# Patient Record
Sex: FEMALE | Race: ASIAN | NOT HISPANIC OR LATINO | URBAN - METROPOLITAN AREA
[De-identification: names, ages, dates, MRNs, and addresses within clinical notes are randomized per-mention and may not be internally consistent; named-entity substitution may affect disease eponyms.]

---

## 2021-01-05 VITALS
SYSTOLIC BLOOD PRESSURE: 153 MMHG | RESPIRATION RATE: 88 BRPM | TEMPERATURE: 98 F | HEART RATE: 88 BPM | DIASTOLIC BLOOD PRESSURE: 66 MMHG | OXYGEN SATURATION: 97 %

## 2021-01-05 LAB
ALBUMIN SERPL ELPH-MCNC: 3.3 G/DL — SIGNIFICANT CHANGE UP (ref 3.3–5)
ALP SERPL-CCNC: 79 U/L — SIGNIFICANT CHANGE UP (ref 40–120)
ALT FLD-CCNC: 16 U/L — SIGNIFICANT CHANGE UP (ref 4–33)
ANION GAP SERPL CALC-SCNC: 13 MMOL/L — SIGNIFICANT CHANGE UP (ref 7–14)
APTT BLD: 30.1 SEC — SIGNIFICANT CHANGE UP (ref 27–36.3)
AST SERPL-CCNC: 35 U/L — HIGH (ref 4–32)
BASE EXCESS BLDV CALC-SCNC: 1.9 MMOL/L — SIGNIFICANT CHANGE UP (ref -3–2)
BASOPHILS # BLD AUTO: 0.02 K/UL — SIGNIFICANT CHANGE UP (ref 0–0.2)
BASOPHILS NFR BLD AUTO: 0.2 % — SIGNIFICANT CHANGE UP (ref 0–2)
BILIRUB SERPL-MCNC: 0.4 MG/DL — SIGNIFICANT CHANGE UP (ref 0.2–1.2)
BLOOD GAS VENOUS - CREATININE: 1.7 MG/DL — HIGH (ref 0.5–1.3)
BLOOD GAS VENOUS COMPREHENSIVE RESULT: SIGNIFICANT CHANGE UP
BUN SERPL-MCNC: 34 MG/DL — HIGH (ref 7–23)
CALCIUM SERPL-MCNC: 9.9 MG/DL — SIGNIFICANT CHANGE UP (ref 8.4–10.5)
CHLORIDE BLDV-SCNC: 103 MMOL/L — SIGNIFICANT CHANGE UP (ref 96–108)
CHLORIDE SERPL-SCNC: 98 MMOL/L — SIGNIFICANT CHANGE UP (ref 98–107)
CO2 SERPL-SCNC: 23 MMOL/L — SIGNIFICANT CHANGE UP (ref 22–31)
CREAT SERPL-MCNC: 1.38 MG/DL — HIGH (ref 0.5–1.3)
EOSINOPHIL # BLD AUTO: 0.09 K/UL — SIGNIFICANT CHANGE UP (ref 0–0.5)
EOSINOPHIL NFR BLD AUTO: 0.9 % — SIGNIFICANT CHANGE UP (ref 0–6)
GAS PNL BLDV: 134 MMOL/L — LOW (ref 136–146)
GLUCOSE BLDV-MCNC: 125 MG/DL — HIGH (ref 70–99)
GLUCOSE SERPL-MCNC: 116 MG/DL — HIGH (ref 70–99)
HCO3 BLDV-SCNC: 25 MMOL/L — SIGNIFICANT CHANGE UP (ref 20–27)
HCT VFR BLD CALC: 43.6 % — SIGNIFICANT CHANGE UP (ref 34.5–45)
HCT VFR BLDA CALC: 43.5 % — SIGNIFICANT CHANGE UP (ref 34.5–46.5)
HGB BLD CALC-MCNC: 14.2 G/DL — SIGNIFICANT CHANGE UP (ref 11.5–15.5)
HGB BLD-MCNC: 13.6 G/DL — SIGNIFICANT CHANGE UP (ref 11.5–15.5)
IANC: 7.15 K/UL — SIGNIFICANT CHANGE UP (ref 1.5–8.5)
IMM GRANULOCYTES NFR BLD AUTO: 0.7 % — SIGNIFICANT CHANGE UP (ref 0–1.5)
INR BLD: 1.09 RATIO — SIGNIFICANT CHANGE UP (ref 0.88–1.16)
LACTATE BLDV-MCNC: 2.7 MMOL/L — HIGH (ref 0.5–2)
LYMPHOCYTES # BLD AUTO: 1.46 K/UL — SIGNIFICANT CHANGE UP (ref 1–3.3)
LYMPHOCYTES # BLD AUTO: 15 % — SIGNIFICANT CHANGE UP (ref 13–44)
MCHC RBC-ENTMCNC: 28.3 PG — SIGNIFICANT CHANGE UP (ref 27–34)
MCHC RBC-ENTMCNC: 31.2 GM/DL — LOW (ref 32–36)
MCV RBC AUTO: 90.6 FL — SIGNIFICANT CHANGE UP (ref 80–100)
MONOCYTES # BLD AUTO: 0.97 K/UL — HIGH (ref 0–0.9)
MONOCYTES NFR BLD AUTO: 9.9 % — SIGNIFICANT CHANGE UP (ref 2–14)
NEUTROPHILS # BLD AUTO: 7.15 K/UL — SIGNIFICANT CHANGE UP (ref 1.8–7.4)
NEUTROPHILS NFR BLD AUTO: 73.3 % — SIGNIFICANT CHANGE UP (ref 43–77)
NRBC # BLD: 0 /100 WBCS — SIGNIFICANT CHANGE UP
NRBC # FLD: 0 K/UL — SIGNIFICANT CHANGE UP
PCO2 BLDV: 52 MMHG — HIGH (ref 41–51)
PH BLDV: 7.34 — SIGNIFICANT CHANGE UP (ref 7.32–7.43)
PLATELET # BLD AUTO: 313 K/UL — SIGNIFICANT CHANGE UP (ref 150–400)
PO2 BLDV: 58 MMHG — HIGH (ref 35–40)
POTASSIUM BLDV-SCNC: 6 MMOL/L — HIGH (ref 3.4–4.5)
POTASSIUM SERPL-MCNC: 5.2 MMOL/L — SIGNIFICANT CHANGE UP (ref 3.5–5.3)
POTASSIUM SERPL-SCNC: 5.2 MMOL/L — SIGNIFICANT CHANGE UP (ref 3.5–5.3)
PROT SERPL-MCNC: 8.1 G/DL — SIGNIFICANT CHANGE UP (ref 6–8.3)
PROTHROM AB SERPL-ACNC: 12.4 SEC — SIGNIFICANT CHANGE UP (ref 10.6–13.6)
RBC # BLD: 4.81 M/UL — SIGNIFICANT CHANGE UP (ref 3.8–5.2)
RBC # FLD: 13.4 % — SIGNIFICANT CHANGE UP (ref 10.3–14.5)
SAO2 % BLDV: 86.2 % — HIGH (ref 60–85)
SODIUM SERPL-SCNC: 134 MMOL/L — LOW (ref 135–145)
TROPONIN T, HIGH SENSITIVITY RESULT: 21 NG/L — SIGNIFICANT CHANGE UP
WBC # BLD: 9.76 K/UL — SIGNIFICANT CHANGE UP (ref 3.8–10.5)
WBC # FLD AUTO: 9.76 K/UL — SIGNIFICANT CHANGE UP (ref 3.8–10.5)

## 2021-01-05 PROCEDURE — 71045 X-RAY EXAM CHEST 1 VIEW: CPT | Mod: 26

## 2021-01-05 RX ORDER — SODIUM CHLORIDE 9 MG/ML
1000 INJECTION INTRAMUSCULAR; INTRAVENOUS; SUBCUTANEOUS ONCE
Refills: 0 | Status: COMPLETED | OUTPATIENT
Start: 2021-01-05 | End: 2021-01-05

## 2021-01-05 RX ADMIN — SODIUM CHLORIDE 1000 MILLILITER(S): 9 INJECTION INTRAMUSCULAR; INTRAVENOUS; SUBCUTANEOUS at 23:49

## 2021-01-05 NOTE — ED ADULT NURSE NOTE - NSIMPLEMENTINTERV_GEN_ALL_ED
Implemented All Fall with Harm Risk Interventions:  [place] to call system. Call bell, personal items and telephone within reach. Instruct patient to call for assistance. Room bathroom lighting operational. Non-slip footwear when patient is off stretcher. Physically safe environment: no spills, clutter or unnecessary equipment. Stretcher in lowest position, wheels locked, appropriate side rails in place. Provide visual cue, wrist band, yellow gown, etc. Monitor gait and stability. Monitor for mental status changes and reorient to person, place, and time. Review medications for side effects contributing to fall risk. Reinforce activity limits and safety measures with patient and family. Provide visual clues: red socks.

## 2021-01-05 NOTE — ED PROVIDER NOTE - OBJECTIVE STATEMENT
Translation Service: [id number] Translation Service: [id number]    Patient is a 89 y/o F with hx of HTN presenting with c/o cough and intermittent SOB x 2 weeks with no associated chest pain, fever, chills, abdominal pain, n/v. A per patient nephew, patient was in contact with brother who is COVID +. Translation Service: [id number].  [place]e  Patient is a 89 y/o F with hx of HTN presenting with c/o cough and intermittent SOB x 2 weeks with no associated chest pain, fever, chills, abdominal pain, n/v. A per patient nephew, patient was in contact with brother who is COVID +.

## 2021-01-05 NOTE — ED PROVIDER NOTE - ATTENDING CONTRIBUTION TO CARE
MD Craig:  patient seen and evaluated with the PA.  I was present for key portions of the History & Physical, and I agree with the Impression & Plan.  89 yo F, c/o cough, SOB.  Quality: dry cough.  Associated Sx: no f/c, no n/v/d, no abd pain.    Context:  +sick contact with COVID  VS:  +hypoxia 80s on RA.  HR boderline tachy.  Normotensive.    Physical Exam:  elderly F, +SOB appearing, NCAT, PERRL, EOMI, neck supple, +bilateral rales, abd s/nd/nt, no edema.  AAOx3.  Impression:  Symptomatic COVID pneumonia with associated hypoxia.  Plan: COVID admit bundle, supplemental O2, respiratory isolation.  Will Discuss DNR/DNI status.

## 2021-01-05 NOTE — ED ADULT NURSE NOTE - OBJECTIVE STATEMENT
Received patient to room 20 for SOB. Patient cantonese speaking, ELKE Lopezt on phone with patient's family to interpret and for collateral, patient belly breathing, use of accessory muscles noted at this time. Patient tachypenic RR in 30s, PA Smartt at bedside, 100% on 4L nasal cannula at this time. Appears uncomfortable, wheezing noted. Abdomen distended, soft and nontender. Vitals as noted, Right 20G Forearm placed, waiting further orders.

## 2021-01-05 NOTE — ED ADULT TRIAGE NOTE - CHIEF COMPLAINT QUOTE
brought in by nephew for shortness of breath, cough x 1 week. reports contact with covid + relatives. O2 sat 89% on room air. placed 2 L NC with improvement to 97%. denies chest pain at present.

## 2021-01-05 NOTE — ED PROVIDER NOTE - CLINICAL SUMMARY MEDICAL DECISION MAKING FREE TEXT BOX
89 y/o F presenting with nonproductive cough, and intermittent SOB x 2 weeks. patient with COVID + contact. on presentation patient appears tachypneic, actively coughing, crackles noted. plan for routine labs, RVP CXR, likely admission

## 2021-01-06 ENCOUNTER — INPATIENT (INPATIENT)
Facility: HOSPITAL | Age: 86
LOS: 9 days | Discharge: NURSING, REHAB OR LONG-TERM CARE | End: 2021-01-16
Attending: INTERNAL MEDICINE | Admitting: INTERNAL MEDICINE
Payer: MEDICARE

## 2021-01-06 DIAGNOSIS — N17.9 ACUTE KIDNEY FAILURE, UNSPECIFIED: ICD-10-CM

## 2021-01-06 DIAGNOSIS — U07.1 COVID-19: ICD-10-CM

## 2021-01-06 DIAGNOSIS — I48.91 UNSPECIFIED ATRIAL FIBRILLATION: ICD-10-CM

## 2021-01-06 DIAGNOSIS — Z29.9 ENCOUNTER FOR PROPHYLACTIC MEASURES, UNSPECIFIED: ICD-10-CM

## 2021-01-06 DIAGNOSIS — R09.02 HYPOXEMIA: ICD-10-CM

## 2021-01-06 DIAGNOSIS — E11.9 TYPE 2 DIABETES MELLITUS WITHOUT COMPLICATIONS: ICD-10-CM

## 2021-01-06 DIAGNOSIS — J96.01 ACUTE RESPIRATORY FAILURE WITH HYPOXIA: ICD-10-CM

## 2021-01-06 DIAGNOSIS — I10 ESSENTIAL (PRIMARY) HYPERTENSION: ICD-10-CM

## 2021-01-06 LAB
A1C WITH ESTIMATED AVERAGE GLUCOSE RESULT: 6.7 % — HIGH (ref 4–5.6)
A1C WITH ESTIMATED AVERAGE GLUCOSE RESULT: 6.8 % — HIGH (ref 4–5.6)
ALBUMIN SERPL ELPH-MCNC: 3 G/DL — LOW (ref 3.3–5)
ALP SERPL-CCNC: 67 U/L — SIGNIFICANT CHANGE UP (ref 40–120)
ALT FLD-CCNC: 13 U/L — SIGNIFICANT CHANGE UP (ref 4–33)
ANION GAP SERPL CALC-SCNC: 10 MMOL/L — SIGNIFICANT CHANGE UP (ref 7–14)
ANION GAP SERPL CALC-SCNC: 7 MMOL/L — SIGNIFICANT CHANGE UP (ref 7–14)
AST SERPL-CCNC: 21 U/L — SIGNIFICANT CHANGE UP (ref 4–32)
AT III ACT/NOR PPP CHRO: 53 % — LOW (ref 76–140)
B PERT DNA SPEC QL NAA+PROBE: SIGNIFICANT CHANGE UP
BASOPHILS # BLD AUTO: 0.01 K/UL — SIGNIFICANT CHANGE UP (ref 0–0.2)
BASOPHILS # BLD AUTO: 0.01 K/UL — SIGNIFICANT CHANGE UP (ref 0–0.2)
BASOPHILS NFR BLD AUTO: 0.1 % — SIGNIFICANT CHANGE UP (ref 0–2)
BASOPHILS NFR BLD AUTO: 0.1 % — SIGNIFICANT CHANGE UP (ref 0–2)
BILIRUB SERPL-MCNC: 0.3 MG/DL — SIGNIFICANT CHANGE UP (ref 0.2–1.2)
BUN SERPL-MCNC: 22 MG/DL — SIGNIFICANT CHANGE UP (ref 7–23)
BUN SERPL-MCNC: 27 MG/DL — HIGH (ref 7–23)
C PNEUM DNA SPEC QL NAA+PROBE: SIGNIFICANT CHANGE UP
CALCIUM SERPL-MCNC: 9.1 MG/DL — SIGNIFICANT CHANGE UP (ref 8.4–10.5)
CALCIUM SERPL-MCNC: 9.1 MG/DL — SIGNIFICANT CHANGE UP (ref 8.4–10.5)
CHLORIDE SERPL-SCNC: 102 MMOL/L — SIGNIFICANT CHANGE UP (ref 98–107)
CHLORIDE SERPL-SCNC: 104 MMOL/L — SIGNIFICANT CHANGE UP (ref 98–107)
CK SERPL-CCNC: 90 U/L — SIGNIFICANT CHANGE UP (ref 25–170)
CO2 SERPL-SCNC: 25 MMOL/L — SIGNIFICANT CHANGE UP (ref 22–31)
CO2 SERPL-SCNC: 26 MMOL/L — SIGNIFICANT CHANGE UP (ref 22–31)
CREAT SERPL-MCNC: 0.86 MG/DL — SIGNIFICANT CHANGE UP (ref 0.5–1.3)
CREAT SERPL-MCNC: 0.87 MG/DL — SIGNIFICANT CHANGE UP (ref 0.5–1.3)
CREAT SERPL-MCNC: 1.08 MG/DL — SIGNIFICANT CHANGE UP (ref 0.5–1.3)
CRP SERPL-MCNC: 115.8 MG/L — HIGH
CRP SERPL-MCNC: 115.9 MG/L — HIGH
D DIMER BLD IA.RAPID-MCNC: 7959 NG/ML DDU — HIGH
D DIMER BLD IA.RAPID-MCNC: 8352 NG/ML DDU — HIGH
EOSINOPHIL # BLD AUTO: 0.08 K/UL — SIGNIFICANT CHANGE UP (ref 0–0.5)
EOSINOPHIL # BLD AUTO: 0.11 K/UL — SIGNIFICANT CHANGE UP (ref 0–0.5)
EOSINOPHIL NFR BLD AUTO: 1.1 % — SIGNIFICANT CHANGE UP (ref 0–6)
EOSINOPHIL NFR BLD AUTO: 1.6 % — SIGNIFICANT CHANGE UP (ref 0–6)
ESTIMATED AVERAGE GLUCOSE: 146 MG/DL — HIGH (ref 68–114)
ESTIMATED AVERAGE GLUCOSE: 148 MG/DL — HIGH (ref 68–114)
FERRITIN SERPL-MCNC: 370 NG/ML — HIGH (ref 15–150)
FIBRINOGEN PPP-MCNC: 628 MG/DL — HIGH (ref 290–520)
FLUAV H1 2009 PAND RNA SPEC QL NAA+PROBE: SIGNIFICANT CHANGE UP
FLUAV H1 RNA SPEC QL NAA+PROBE: SIGNIFICANT CHANGE UP
FLUAV H3 RNA SPEC QL NAA+PROBE: SIGNIFICANT CHANGE UP
FLUAV SUBTYP SPEC NAA+PROBE: SIGNIFICANT CHANGE UP
FLUBV RNA SPEC QL NAA+PROBE: SIGNIFICANT CHANGE UP
GLUCOSE BLDC GLUCOMTR-MCNC: 100 MG/DL — HIGH (ref 70–99)
GLUCOSE BLDC GLUCOMTR-MCNC: 101 MG/DL — HIGH (ref 70–99)
GLUCOSE BLDC GLUCOMTR-MCNC: 115 MG/DL — HIGH (ref 70–99)
GLUCOSE SERPL-MCNC: 122 MG/DL — HIGH (ref 70–99)
GLUCOSE SERPL-MCNC: 178 MG/DL — HIGH (ref 70–99)
HADV DNA SPEC QL NAA+PROBE: SIGNIFICANT CHANGE UP
HCOV PNL SPEC NAA+PROBE: SIGNIFICANT CHANGE UP
HCT VFR BLD CALC: 37.2 % — SIGNIFICANT CHANGE UP (ref 34.5–45)
HCT VFR BLD CALC: 37.7 % — SIGNIFICANT CHANGE UP (ref 34.5–45)
HGB BLD-MCNC: 11.5 G/DL — SIGNIFICANT CHANGE UP (ref 11.5–15.5)
HGB BLD-MCNC: 11.9 G/DL — SIGNIFICANT CHANGE UP (ref 11.5–15.5)
HMPV RNA SPEC QL NAA+PROBE: SIGNIFICANT CHANGE UP
HPIV1 RNA SPEC QL NAA+PROBE: SIGNIFICANT CHANGE UP
HPIV2 RNA SPEC QL NAA+PROBE: SIGNIFICANT CHANGE UP
HPIV3 RNA SPEC QL NAA+PROBE: SIGNIFICANT CHANGE UP
HPIV4 RNA SPEC QL NAA+PROBE: SIGNIFICANT CHANGE UP
IANC: 5.33 K/UL — SIGNIFICANT CHANGE UP (ref 1.5–8.5)
IANC: 5.33 K/UL — SIGNIFICANT CHANGE UP (ref 1.5–8.5)
IMM GRANULOCYTES NFR BLD AUTO: 0.4 % — SIGNIFICANT CHANGE UP (ref 0–1.5)
IMM GRANULOCYTES NFR BLD AUTO: 0.7 % — SIGNIFICANT CHANGE UP (ref 0–1.5)
INR BLD: 1.13 RATIO — SIGNIFICANT CHANGE UP (ref 0.88–1.16)
LACTATE SERPL-SCNC: 1 MMOL/L — SIGNIFICANT CHANGE UP (ref 0.5–2)
LDH SERPL L TO P-CCNC: 321 U/L — HIGH (ref 135–225)
LDH SERPL L TO P-CCNC: 326 U/L — HIGH (ref 135–225)
LYMPHOCYTES # BLD AUTO: 0.75 K/UL — LOW (ref 1–3.3)
LYMPHOCYTES # BLD AUTO: 0.79 K/UL — LOW (ref 1–3.3)
LYMPHOCYTES # BLD AUTO: 10.7 % — LOW (ref 13–44)
LYMPHOCYTES # BLD AUTO: 11.3 % — LOW (ref 13–44)
MAGNESIUM SERPL-MCNC: 2.2 MG/DL — SIGNIFICANT CHANGE UP (ref 1.6–2.6)
MCHC RBC-ENTMCNC: 28.3 PG — SIGNIFICANT CHANGE UP (ref 27–34)
MCHC RBC-ENTMCNC: 28.5 PG — SIGNIFICANT CHANGE UP (ref 27–34)
MCHC RBC-ENTMCNC: 30.9 GM/DL — LOW (ref 32–36)
MCHC RBC-ENTMCNC: 31.6 GM/DL — LOW (ref 32–36)
MCV RBC AUTO: 90.2 FL — SIGNIFICANT CHANGE UP (ref 80–100)
MCV RBC AUTO: 91.6 FL — SIGNIFICANT CHANGE UP (ref 80–100)
MONOCYTES # BLD AUTO: 0.75 K/UL — SIGNIFICANT CHANGE UP (ref 0–0.9)
MONOCYTES # BLD AUTO: 0.76 K/UL — SIGNIFICANT CHANGE UP (ref 0–0.9)
MONOCYTES NFR BLD AUTO: 10.7 % — SIGNIFICANT CHANGE UP (ref 2–14)
MONOCYTES NFR BLD AUTO: 10.8 % — SIGNIFICANT CHANGE UP (ref 2–14)
NEUTROPHILS # BLD AUTO: 5.33 K/UL — SIGNIFICANT CHANGE UP (ref 1.8–7.4)
NEUTROPHILS # BLD AUTO: 5.33 K/UL — SIGNIFICANT CHANGE UP (ref 1.8–7.4)
NEUTROPHILS NFR BLD AUTO: 76.1 % — SIGNIFICANT CHANGE UP (ref 43–77)
NEUTROPHILS NFR BLD AUTO: 76.4 % — SIGNIFICANT CHANGE UP (ref 43–77)
NRBC # BLD: 0 /100 WBCS — SIGNIFICANT CHANGE UP
NRBC # BLD: 0 /100 WBCS — SIGNIFICANT CHANGE UP
NRBC # FLD: 0 K/UL — SIGNIFICANT CHANGE UP
NRBC # FLD: 0 K/UL — SIGNIFICANT CHANGE UP
PHOSPHATE SERPL-MCNC: 2.6 MG/DL — SIGNIFICANT CHANGE UP (ref 2.5–4.5)
PLATELET # BLD AUTO: 259 K/UL — SIGNIFICANT CHANGE UP (ref 150–400)
PLATELET # BLD AUTO: 278 K/UL — SIGNIFICANT CHANGE UP (ref 150–400)
POTASSIUM SERPL-MCNC: 4.4 MMOL/L — SIGNIFICANT CHANGE UP (ref 3.5–5.3)
POTASSIUM SERPL-MCNC: 4.5 MMOL/L — SIGNIFICANT CHANGE UP (ref 3.5–5.3)
POTASSIUM SERPL-SCNC: 4.4 MMOL/L — SIGNIFICANT CHANGE UP (ref 3.5–5.3)
POTASSIUM SERPL-SCNC: 4.5 MMOL/L — SIGNIFICANT CHANGE UP (ref 3.5–5.3)
PROCALCITONIN SERPL-MCNC: 0.19 NG/ML — HIGH (ref 0.02–0.1)
PROT SERPL-MCNC: 6.9 G/DL — SIGNIFICANT CHANGE UP (ref 6–8.3)
PROTHROM AB SERPL-ACNC: 12.8 SEC — SIGNIFICANT CHANGE UP (ref 10.6–13.6)
RAPID RVP RESULT: DETECTED
RBC # BLD: 4.06 M/UL — SIGNIFICANT CHANGE UP (ref 3.8–5.2)
RBC # BLD: 4.18 M/UL — SIGNIFICANT CHANGE UP (ref 3.8–5.2)
RBC # FLD: 13.5 % — SIGNIFICANT CHANGE UP (ref 10.3–14.5)
RBC # FLD: 13.5 % — SIGNIFICANT CHANGE UP (ref 10.3–14.5)
RV+EV RNA SPEC QL NAA+PROBE: SIGNIFICANT CHANGE UP
SARS-COV-2 RNA SPEC QL NAA+PROBE: DETECTED
SODIUM SERPL-SCNC: 137 MMOL/L — SIGNIFICANT CHANGE UP (ref 135–145)
SODIUM SERPL-SCNC: 137 MMOL/L — SIGNIFICANT CHANGE UP (ref 135–145)
TROPONIN T, HIGH SENSITIVITY RESULT: 19 NG/L — SIGNIFICANT CHANGE UP
TROPONIN T, HIGH SENSITIVITY RESULT: 21 NG/L — SIGNIFICANT CHANGE UP
WBC # BLD: 6.99 K/UL — SIGNIFICANT CHANGE UP (ref 3.8–10.5)
WBC # BLD: 7.01 K/UL — SIGNIFICANT CHANGE UP (ref 3.8–10.5)
WBC # FLD AUTO: 6.99 K/UL — SIGNIFICANT CHANGE UP (ref 3.8–10.5)
WBC # FLD AUTO: 7.01 K/UL — SIGNIFICANT CHANGE UP (ref 3.8–10.5)

## 2021-01-06 PROCEDURE — 99222 1ST HOSP IP/OBS MODERATE 55: CPT

## 2021-01-06 PROCEDURE — 99285 EMERGENCY DEPT VISIT HI MDM: CPT

## 2021-01-06 PROCEDURE — 99223 1ST HOSP IP/OBS HIGH 75: CPT

## 2021-01-06 RX ORDER — DEXAMETHASONE 0.5 MG/5ML
6 ELIXIR ORAL ONCE
Refills: 0 | Status: COMPLETED | OUTPATIENT
Start: 2021-01-06 | End: 2021-01-06

## 2021-01-06 RX ORDER — MORPHINE SULFATE 50 MG/1
2 CAPSULE, EXTENDED RELEASE ORAL EVERY 4 HOURS
Refills: 0 | Status: DISCONTINUED | OUTPATIENT
Start: 2021-01-06 | End: 2021-01-06

## 2021-01-06 RX ORDER — DEXTROSE 50 % IN WATER 50 %
25 SYRINGE (ML) INTRAVENOUS ONCE
Refills: 0 | Status: DISCONTINUED | OUTPATIENT
Start: 2021-01-06 | End: 2021-01-16

## 2021-01-06 RX ORDER — DEXAMETHASONE 0.5 MG/5ML
6 ELIXIR ORAL ONCE
Refills: 0 | Status: DISCONTINUED | OUTPATIENT
Start: 2021-01-06 | End: 2021-01-06

## 2021-01-06 RX ORDER — METOPROLOL TARTRATE 50 MG
12.5 TABLET ORAL
Refills: 0 | Status: DISCONTINUED | OUTPATIENT
Start: 2021-01-06 | End: 2021-01-07

## 2021-01-06 RX ORDER — DEXAMETHASONE 0.5 MG/5ML
6 ELIXIR ORAL DAILY
Refills: 0 | Status: COMPLETED | OUTPATIENT
Start: 2021-01-07 | End: 2021-01-15

## 2021-01-06 RX ORDER — ACETAMINOPHEN 500 MG
650 TABLET ORAL EVERY 6 HOURS
Refills: 0 | Status: DISCONTINUED | OUTPATIENT
Start: 2021-01-06 | End: 2021-01-16

## 2021-01-06 RX ORDER — INSULIN LISPRO 100/ML
VIAL (ML) SUBCUTANEOUS
Refills: 0 | Status: DISCONTINUED | OUTPATIENT
Start: 2021-01-06 | End: 2021-01-16

## 2021-01-06 RX ORDER — HEPARIN SODIUM 5000 [USP'U]/ML
5000 INJECTION INTRAVENOUS; SUBCUTANEOUS EVERY 12 HOURS
Refills: 0 | Status: DISCONTINUED | OUTPATIENT
Start: 2021-01-06 | End: 2021-01-06

## 2021-01-06 RX ORDER — ENOXAPARIN SODIUM 100 MG/ML
40 INJECTION SUBCUTANEOUS DAILY
Refills: 0 | Status: DISCONTINUED | OUTPATIENT
Start: 2021-01-06 | End: 2021-01-16

## 2021-01-06 RX ORDER — ENOXAPARIN SODIUM 100 MG/ML
40 INJECTION SUBCUTANEOUS DAILY
Refills: 0 | Status: DISCONTINUED | OUTPATIENT
Start: 2021-01-06 | End: 2021-01-06

## 2021-01-06 RX ORDER — AZITHROMYCIN 500 MG/1
500 TABLET, FILM COATED ORAL ONCE
Refills: 0 | Status: COMPLETED | OUTPATIENT
Start: 2021-01-06 | End: 2021-01-06

## 2021-01-06 RX ORDER — INSULIN LISPRO 100/ML
VIAL (ML) SUBCUTANEOUS AT BEDTIME
Refills: 0 | Status: DISCONTINUED | OUTPATIENT
Start: 2021-01-06 | End: 2021-01-16

## 2021-01-06 RX ORDER — SODIUM CHLORIDE 9 MG/ML
1000 INJECTION INTRAMUSCULAR; INTRAVENOUS; SUBCUTANEOUS
Refills: 0 | Status: DISCONTINUED | OUTPATIENT
Start: 2021-01-06 | End: 2021-01-08

## 2021-01-06 RX ORDER — CEFTRIAXONE 500 MG/1
1000 INJECTION, POWDER, FOR SOLUTION INTRAMUSCULAR; INTRAVENOUS ONCE
Refills: 0 | Status: COMPLETED | OUTPATIENT
Start: 2021-01-06 | End: 2021-01-06

## 2021-01-06 RX ORDER — ALBUTEROL 90 UG/1
2 AEROSOL, METERED ORAL EVERY 4 HOURS
Refills: 0 | Status: DISCONTINUED | OUTPATIENT
Start: 2021-01-06 | End: 2021-01-16

## 2021-01-06 RX ADMIN — CEFTRIAXONE 1000 MILLIGRAM(S): 500 INJECTION, POWDER, FOR SOLUTION INTRAMUSCULAR; INTRAVENOUS at 03:13

## 2021-01-06 RX ADMIN — CEFTRIAXONE 100 MILLIGRAM(S): 500 INJECTION, POWDER, FOR SOLUTION INTRAMUSCULAR; INTRAVENOUS at 00:46

## 2021-01-06 RX ADMIN — AZITHROMYCIN 255 MILLIGRAM(S): 500 TABLET, FILM COATED ORAL at 03:12

## 2021-01-06 RX ADMIN — Medication 6 MILLIGRAM(S): at 10:47

## 2021-01-06 RX ADMIN — Medication 12.5 MILLIGRAM(S): at 16:23

## 2021-01-06 RX ADMIN — SODIUM CHLORIDE 1000 MILLILITER(S): 9 INJECTION INTRAMUSCULAR; INTRAVENOUS; SUBCUTANEOUS at 03:13

## 2021-01-06 NOTE — CHART NOTE - NSCHARTNOTEFT_GEN_A_CORE
1/6 rapid afib: ekg afib 104: EP consulted : as per family will hold off on ac: metoprolol added.  D/w Dr. Brie Muniz in agreement with plan

## 2021-01-06 NOTE — H&P ADULT - NSHPREVIEWOFSYSTEMS_GEN_ALL_CORE
CONSTITUTIONAL: No fever; No chills; No night sweats; No weight loss; + fatigue  EYES: No eye pain; No blurry vision  ENMT:  No difficulty hearing; No sinus or throat pain  NECK: No pain or stiffness  RESPIRATORY: +cough; No wheezing; No hemoptysis; + shortness of breath; No sputum production  CARDIOVASCULAR: No chest pain; No palpitations; No leg swelling  GASTROINTESTINAL: No abdominal pain; No nausea; No vomiting; No hematemesis; No diarrhea; No constipation. No melena or hematochezia.  GENITOURINARY: No dysuria; No frequency; No hematuria; No incontinence  NEUROLOGICAL: No headaches; No loss of strength; No numbness  SKIN: No itching/burning; No rashes or lesions   MUSCULOSKELETAL: No joint pain or swelling; No muscle, back, or extremity pain  HEME/LYMPH: No easy bruising, or bleeding gums

## 2021-01-06 NOTE — CHART NOTE - NSCHARTNOTEFT_GEN_A_CORE
HEP-COVID Trial Enrollment Note    The patient was approached for participation in the HEP-COVID trial, a randomized clinical trial of systemic full-dose low molecular weight heparin (LMWH) versus prophylactic- or intermediate-dose LMWH in high-risk COVID-19 patients. After extensive discussion with the patient/family/physicians of record, the patient meets all inclusion criteria and has no known exclusions. In particular, qualifying factors include:    Confirmed COVID-19 illness via positive PCR  Hypoxia (SpO2 89% on room air)  Elevated D-dimer (7959)    Risks, benefits, and alternatives were discussed, and all questions were answered. The patient signed the Informed Consent Form.     At this time, baseline labs will be drawn, which include:    PT/INR  Platelet count  Hemoglobin/hematocrit  Serum creatinine  D-dimer  C-reactive protein (CRP)  Fibrinogen  Troponin T  Antiphospholipid studies (lupus anticoagulant by dilute Jaya Viper Venom Study and Silica Clotting Time, anticardiolipin antibodies, [id number] antibodies)   Protein C antigen/activity and Protein S Antigen/activity  Antithrombin activity    The patient will be randomized to treatment-dose LMWH vs prophylactic-/intermediate-dose LMWH. Nursing staff will be notified of patient's enrollment, and Pharmacy will be contacted to dispense study medication.

## 2021-01-06 NOTE — H&P ADULT - PROBLEM SELECTOR PLAN 1
2/2 COVID 19 PNA on NRB,. 02 sat 98%  - CXR w/with peripheral opacities more on the R than L   - decadron 6mg IVP x 10 days  - not a candidate for remdesevir due to elevated BUN/Cr  - continuos pulse ox to monitor 02 sat , maintain 02 sat >93%  - monitor for fever  - check inflammatory makers q72hrs  - chest PT, bedside spirometry  PRN  - Code status discussed with nephew, full code On admission was saturating 89% on RA, currently on NRB  - due to COVID-19 (management as below)  - continuous pulse oximetry, wean off supplemental oxygen as tolerated On admission was saturating 89% on RA, currently on NRB  - due to COVID-19 (management as below)  - continuous pulse oximetry, wean off supplemental oxygen as tolerated  - trend lactate, initial BVG lactate 2.7

## 2021-01-06 NOTE — H&P ADULT - PROBLEM SELECTOR PLAN 7
- Lovenox 40 subq daily based on BMI/renal clearance in setting of COVID  - GOC:  code status discussed with nephew, FULL CODE

## 2021-01-06 NOTE — H&P ADULT - ASSESSMENT
89 year old Female, Cantonese speaking,  with Hx of HTN presents with cough and intermittent SOB. Admit to medicine for hypoxia 2/2 to COVID 19 PNA.  89 year old Female, Cantonese speaking,  with Hx of HTN presents with cough and intermittent SOB. Admit to medicine for hypoxemia 2/2 to COVID 19 PNA.  89F, Cantonese-speaking, with hx of HTN, T2DM (A1C 6.7), who presents with cough and SOB, a/w acute hypoxic respiratory failure 2/2 COVID-19.    89F, Cantonese-speaking, with hx of HTN, T2DM (A1C 6.7), who presents with cough and SOB, a/w acute hypoxic respiratory failure 2/2 COVID-19. Course c/b new onset afib.

## 2021-01-06 NOTE — CONSULT NOTE ADULT - ATTENDING COMMENTS
89 year old Female, Cantonese speaking with Hx of HTN presented with non productive cough, intermittent SOB x 2 week and + COVID. Found to have paroxysmal atrial fibrillation with asymptomatic conversion pauses up to 2.3 secs. Currently in sinus rhythm and is started on low dose metoprolol. Patient is on Lovenox trial. CHADs2 Vasc score=4; age; sex, HTN  - Continue metoprolol 12.5mg BID  - Recommend anticoagulation with apixaban 2.5 mg BID or dose required for systemic anticoagulation with Lovenox is 60mg BID ( 1mg/kg two times/day). Patient's stroke risk is 4.8% per year in >90,000 patients (the [place] Atrial Fibrillation Cohort Study) and has 6.7% risk of stroke/TIA/systemic embolism.  - Continue to monitor on telemetry symptomatic bradycardia or pauses  - Maintain K+~4.0 and Mg++~2.0  - Check TFTs  - Echocardiogram to complete cardiac work up  - Continue other management as per primary team

## 2021-01-06 NOTE — H&P ADULT - NSHPLABSRESULTS_GEN_ALL_CORE
LABORATORY VALUES	 	                          13.6   9.76  )-----------( 313      ( 05 Jan 2021 22:25 )             43.6       01-05    134<L>  |  98  |  34<H>  ----------------------------<  116<H>  5.2   |  23  |  1.38<H>    Ca    9.9      05 Jan 2021 22:25    TPro  8.1  /  Alb  3.3  /  TBili  0.4  /  DBili  x   /  AST  35<H>  /  ALT  16  /  AlkPhos  79  01-05    LIVER FUNCTIONS - ( 05 Jan 2021 22:25 )  Alb: 3.3 g/dL / Pro: 8.1 g/dL / ALK PHOS: 79 U/L / ALT: 16 U/L / AST: 35 U/L / GGT: x           Prothrombin Time, Plasma: 12.4 sec (01-05 @ 22:25)      CARDIAC MARKERS:  Blood Gas Venous - Lactate: 2.7 mmol/L (01-05 @ 22:25)    01-05 @ 23:56  229E Corona Virus --  Adenovirus NotDete  Bordetella pertussis --  Chlamydia pneumoniae NotDete  Entero/Rhino Virus NotDete  HKU1 Coronavirus --  [facility]  Influenza A NotDete  Influenza AH1 NotDete  Influenza AH1 2009 NotDete  Influenza AH3 NotDete  Influenza B NotDete  Mycoplasma pneumoniae NotDete  NL63 Coronavirus --  OC43 Corornavirus --  Parainfluenza 1 NotDete  Parainfluenza 2 NotDete    COVID 19 - positive    CXR: LABORATORY VALUES	 	                          13.6   9.76  )-----------( 313      ( 05 Jan 2021 22:25 )             43.6       01-05    134<L>  |  98  |  34<H>  ----------------------------<  116<H>  5.2   |  23  |  1.38<H>    Ca    9.9      05 Jan 2021 22:25    TPro  8.1  /  Alb  3.3  /  TBili  0.4  /  DBili  x   /  AST  35<H>  /  ALT  16  /  AlkPhos  79  01-05    LIVER FUNCTIONS - ( 05 Jan 2021 22:25 )  Alb: 3.3 g/dL / Pro: 8.1 g/dL / ALK PHOS: 79 U/L / ALT: 16 U/L / AST: 35 U/L / GGT: x           Prothrombin Time, Plasma: 12.4 sec (01-05 @ 22:25)      CARDIAC MARKERS:  Blood Gas Venous - Lactate: 2.7 mmol/L (01-05 @ 22:25)    A1C with Estimated Average Glucose Result: 6.7: High Risk (prediabetic) 5.7 - 6.4 %   D-Dimer Assay, Quantitative: 7959:  Ferritin, Serum: 370 ng/mL (01.06.21 @ 07:39)   Procalcitonin, Serum: 0.19:   01-05 @ 23:56  229E Corona Virus --  [facility]  Bordetella pertussis --  Chlamydia pneumoniae [facility]  [facility]Rhino Virus [facility]  HKU1 Coronavirus --  hMPV [facility]  Influenza A NotDete  Influenza AH1 NotDete  Influenza AH1 2009 NotDete  Influenza AH3 NotDete  [facility]  [facility]  NL63 Coronavirus --  OC43 Corornavirus --  Parainfluenza 1 NotDete  Parainfluenza 2 NotDetec    COVID 19 - positive    CXR with peripheral opacities more on the R than L consistent with COVID 19 multifocal PNA.

## 2021-01-06 NOTE — CHART NOTE - NSCHARTNOTEFT_GEN_A_CORE
trend d-dimer >8000 continue pt on clinical trial of lovenox .  no further anticoagulation at this time.  Family does not want to pursue with full anticoagulation at this time.

## 2021-01-06 NOTE — H&P ADULT - PROBLEM SELECTOR PLAN 2
-currently normotensive  -not on any home medications  -DASH diet  -Monitor blood pressure COVID positive, c/b hypoxic respiratory failure  - inflammatory markers elevated, send LDH, CPK, CRP  - start on decadron, consider remdesevir pending weight/calculation eGFR  - trend d-dimer, crp, ferritin q48-72 hours based on clinical status  - supportive care with PRN cough medications, Tylenol, albuterol   - monitor oxygen saturations closely, repeat lactate COVID positive, c/b hypoxic respiratory failure  - inflammatory markers elevated, send LDH, CPK, CRP  - start on decadron, hold off remdesevir given creatine clearance 30  - trend d-dimer, crp, ferritin q48-72 hours based on clinical status  - supportive care with PRN cough medications, Tylenol, albuterol   - monitor oxygen saturations closely, repeat lactate

## 2021-01-06 NOTE — H&P ADULT - NSHPPHYSICALEXAM_GEN_ALL_CORE
PHYSICAL EXAM:  GENERAL:  mild respiratory distress  HEAD:  Atraumatic, Normocephalic  EYES: EOMI, PERRLA, conjunctiva and sclera clear  NECK: Supple, No JVD  CHEST/LUNG: bibasilar crackles; No wheeze  HEART: Regular rate and rhythm; No murmurs, rubs, or gallops  ABDOMEN: Soft, Nontender, Nondistended; Bowel sounds present  EXTREMITIES:  2+ Peripheral Pulses, No clubbing, cyanosis, or edema  PSYCH: AAOx3  NEUROLOGY: non-focal  SKIN: No rashes or lesions

## 2021-01-06 NOTE — H&P ADULT - PROBLEM SELECTOR PLAN 5
A1C 6.7%  - not on any home medications  - SSI/FS qac and hs, monitor for steroid induced hyperglycemia BP stable  - not on any home medications  - continue to monitor

## 2021-01-06 NOTE — CONSULT NOTE ADULT - SUBJECTIVE AND OBJECTIVE BOX
CHIEF COMPLAINT: Called to evaluate patient with atrial fibrillation    HISTORY OF PRESENT ILLNESS:  89 year old Female, Cantonese speaking,  with Hx of HTN presents with non productive cough and intermittent SOB x 2 week.  In ED, patient with 02 sat 89% on room air, tachypneic up to RR 30. She was placed on supplemental 2L-6L NC with improvement to %. Initial EKG revealed sinus rhythm with HR 80s. Telemetry revealed atrial fibrillation with VR 90s-110s and occasionally up to 130s. She is now converted to sinus rhythm with up to 2.3 sec pauses. Currently telemery reveals sinus rhythm with HR 60s-90s and 50s while sleeping. She is on 100% NRB and no acute distress noted at this time.  Patient is Cantonese speaking and information obtained from medical records  PAST MEDICAL & SURGICAL HISTORY:  HTN (hypertension)  No significant past surgical history    PREVIOUS DIAGNOSTIC TESTING:    Echocardiogram: pending    MEDICATIONS:  metoprolol tartrate 12.5 milliGRAM(s) Oral two times a day  ALBUTerol    90 MICROgram(s) HFA Inhaler 2 Puff(s) Inhalation every 4 hours PRN  benzonatate 100 milliGRAM(s) Oral three times a day PRN  acetaminophen   Tablet .. 650 milliGRAM(s) Oral every 6 hours PRN  dextrose 50% Injectable 25 Gram(s) IV Push once  insulin lispro (ADMELOG) corrective regimen sliding scale   SubCutaneous three times a day before meals  insulin lispro (ADMELOG) corrective regimen sliding scale   SubCutaneous at bedtime  sodium chloride 0.9%. 1000 milliLiter(s) IV Continuous <Continuous>      FAMILY HISTORY:  None significant      SOCIAL HISTORY:      [-] Smoker  [-] Alcohol  [-] Drugs    Allergies    No Known Allergies    Intolerances    REVIEW OF SYSTEMS:  CONSTITUTIONAL: No fever, weight loss, or fatigue  EYES: No eye pain, visual disturbances, or discharge  ENMT:  No difficulty hearing, tinnitus, vertigo; No sinus or throat pain  NECK: No pain or stiffness  RESPIRATORY: No wheezing, chills or hemoptysis; + Shortness of Breath, + cough  CARDIOVASCULAR: No chest pain, palpitations, passing out, dizziness, or leg swelling  GASTROINTESTINAL: No abdominal or epigastric pain. No nausea, vomiting, or hematemesis; No diarrhea or constipation. No melena or hematochezia.  GENITOURINARY: No dysuria, frequency, hematuria, or incontinence  NEUROLOGICAL: No headaches, memory loss, loss of strength, numbness, or tremors  SKIN: No itching, burning, rashes, or lesions   LYMPH Nodes: No enlarged glands  ENDOCRINE: No heat or cold intolerance; No hair loss  MUSCULOSKELETAL: No joint pain or swelling; No muscle, back, or extremity pain  PSYCHIATRIC: No depression, anxiety, mood swings, or difficulty sleeping  HEME/LYMPH: No easy bruising, or bleeding gums  ALLERY AND IMMUNOLOGIC: No hives or eczema	    [X] All others negative	  [ ] Unable to obtain    PHYSICAL EXAM:  T(C): 36.8 (01-06-21 @ 15:21), Max: 37.9 (01-05-21 @ 21:19)  HR: 121 (01-06-21 @ 15:21) (77 - 121)  BP: 124/56 (01-06-21 @ 15:21) (122/56 - 171/75)  RR: 22 (01-06-21 @ 15:21) (22 - 30)  SpO2: 99% (01-06-21 @ 15:21) (93% - 100%)  Wt(kg): --  I&O's Summary      Appearance: Normal	  Cardiovascular: regular rate and rhythm  Respiratory: 100 5 NRB, tachypneic    TELEMETRY: Atrial fibrillation with VR 90s-110s, 130s occ. and sinus rhythm with HR 50s-80s; 2.3 sec conversion pauses    ECG:  Normal sinus rhythm with RBBB; HR 82 bpm	  	    CARDIAC MARKERS:                        11.5   7.01  )-----------( 278      ( 06 Jan 2021 15:11 )             37.2     01-06    137  |  104  |  22  ----------------------------<  178<H>  4.5   |  26  |  0.86    Ca    9.1      06 Jan 2021 15:10  Phos  2.6     01-06  Mg     2.2     01-06    TPro  6.9  /  Alb  3.0<L>  /  TBili  0.3  /  DBili  x   /  AST  21  /  ALT  13  /  AlkPhos  67  01-06      TSH: pending         CHIEF COMPLAINT: Called to evaluate patient with atrial fibrillation    HISTORY OF PRESENT ILLNESS:  89 year old Female, Cantonese speaking,  with Hx of HTN presented with non productive cough and intermittent SOB x 2 weeks. In ED, patient with 02 sat 89% on room air, tachypneic up to RR 30. She was placed on supplemental 2L-6L NC with improvement to %. Initial EKG revealed sinus rhythm with HR 80s. Telemetry revealed atrial fibrillation with VR 90s-110s and occasionally up to 130s. She is now converted to sinus rhythm with up to 2.3 sec pauses. Currently telemery reveals sinus rhythm with HR 60s-90s and 50s while sleeping. She is on 100% NRB and no acute distress noted at this time.  Patient is Cantonese speaking and information obtained from medical records  PAST MEDICAL & SURGICAL HISTORY:  HTN (hypertension)  No significant past surgical history    PREVIOUS DIAGNOSTIC TESTING:    Echocardiogram: pending    MEDICATIONS:  metoprolol tartrate 12.5 milliGRAM(s) Oral two times a day  ALBUTerol    90 MICROgram(s) HFA Inhaler 2 Puff(s) Inhalation every 4 hours PRN  benzonatate 100 milliGRAM(s) Oral three times a day PRN  acetaminophen   Tablet .. 650 milliGRAM(s) Oral every 6 hours PRN  dextrose 50% Injectable 25 Gram(s) IV Push once  insulin lispro (ADMELOG) corrective regimen sliding scale   SubCutaneous three times a day before meals  insulin lispro (ADMELOG) corrective regimen sliding scale   SubCutaneous at bedtime  sodium chloride 0.9%. 1000 milliLiter(s) IV Continuous <Continuous>      FAMILY HISTORY:  None significant      SOCIAL HISTORY:      [-] Smoker  [-] Alcohol  [-] Drugs    Allergies    No Known Allergies    Intolerances    REVIEW OF SYSTEMS:  CONSTITUTIONAL: No fever, weight loss, or fatigue  EYES: No eye pain, visual disturbances, or discharge  ENMT:  No difficulty hearing, tinnitus, vertigo; No sinus or throat pain  NECK: No pain or stiffness  RESPIRATORY: No wheezing, chills or hemoptysis; + Shortness of Breath, + cough  CARDIOVASCULAR: No chest pain, palpitations, passing out, dizziness, or leg swelling  GASTROINTESTINAL: No abdominal or epigastric pain. No nausea, vomiting, or hematemesis; No diarrhea or constipation. No melena or hematochezia.  GENITOURINARY: No dysuria, frequency, hematuria, or incontinence  NEUROLOGICAL: No headaches, memory loss, loss of strength, numbness, or tremors  SKIN: No itching, burning, rashes, or lesions   LYMPH Nodes: No enlarged glands  ENDOCRINE: No heat or cold intolerance; No hair loss  MUSCULOSKELETAL: No joint pain or swelling; No muscle, back, or extremity pain  PSYCHIATRIC: No depression, anxiety, mood swings, or difficulty sleeping  HEME/LYMPH: No easy bruising, or bleeding gums  ALLERY AND IMMUNOLOGIC: No hives or eczema	    [X] All others negative	  [ ] Unable to obtain    PHYSICAL EXAM:  T(C): 36.8 (01-06-21 @ 15:21), Max: 37.9 (01-05-21 @ 21:19)  HR: 121 (01-06-21 @ 15:21) (77 - 121)  BP: 124/56 (01-06-21 @ 15:21) (122/56 - 171/75)  RR: 22 (01-06-21 @ 15:21) (22 - 30)  SpO2: 99% (01-06-21 @ 15:21) (93% - 100%)  Wt(kg): --  I&O's Summary      Appearance: Normal	  Cardiovascular: regular rate and rhythm  Respiratory: 100 5 NRB, tachypneic    TELEMETRY: Atrial fibrillation with VR 90s-110s, 130s occ. and sinus rhythm with HR 50s-80s; 2.3 sec conversion pauses    ECG:  Normal sinus rhythm with RBBB; HR 82 bpm	  	    CARDIAC MARKERS:                        11.5   7.01  )-----------( 278      ( 06 Jan 2021 15:11 )             37.2     01-06    137  |  104  |  22  ----------------------------<  178<H>  4.5   |  26  |  0.86    Ca    9.1      06 Jan 2021 15:10  Phos  2.6     01-06  Mg     2.2     01-06    TPro  6.9  /  Alb  3.0<L>  /  TBili  0.3  /  DBili  x   /  AST  21  /  ALT  13  /  AlkPhos  67  01-06      TSH: pending

## 2021-01-06 NOTE — H&P ADULT - PROBLEM SELECTOR PLAN 6
- HSQ BID - adjust pending weight/BMI calculation in setting of COVID  - GOC:  code status discussed with nephew, FULL CODE - Lovenox 40 subq daily based on BMI/renal clearance in setting of COVID  - GOC:  code status discussed with nephew, FULL CODE A1C 6.7%  - not on any home medications  - SSI/FS qac and hs, monitor for steroid induced hyperglycemia

## 2021-01-06 NOTE — CONSULT NOTE ADULT - ASSESSMENT
89 year old Female, Cantonese speaking with Hx of HTN presented with non productive cough, intermittent SOB x 2 week and + COVID. Found to have paroxysmal atrial fibrillation with asymptomatic conversion pauses up to 2.3 secs. Currently in sinus rhythm and is started on low dose metoprolol. Patient is on Lovenox trial. CHADs2 Vasc score=4; age; sex, HTN  - Continue metoprolol 12.5mg BID  - Recommend anticoagulation with apixaban 2.5 mg BID or dose required for systemic anticoagulation with Lovenox is 60mg BID ( 1mg/kg two times/day). Patient's stroke risk is 4.8% per year in >90,000 patients (the [place] Atrial Fibrillation Cohort Study) and has 6.7% risk of stroke/TIA/systemic embolism.  - Continue to monitor on telemetry symptomatic bradycardia or pauses  - Maintain K+~4.0 and Mg++~2.0  - Check TFTs  - Echocardiogram to complete cardiac work up  - Continue other management as per primary team  - Discussed with Dr. Giron  - Will follow up

## 2021-01-06 NOTE — H&P ADULT - PROBLEM SELECTOR PLAN 3
-Pt has elevated Cr, unsure acute vs chronic, no baseline available at this time, BUN 34 , Creatinine 1.38,  -Trend creatinine, BUN  -Strict I/O  -Avoid neprhotoxic agents. SCr 1.3, unknown baseline  - unclear if TIMA or TIMA on CKD given age  - gentle IVF hydration, trend BMP SCr 1.3 --> 1.08, unknown baseline  - unclear if TIMA or TIMA on CKD given age  - gentle IVF hydration, trend BMP course c/b new onset afib (on EKG)  - [id number] 5 - discussed risks/benefits of AC with nephew Julio Jordan, given patient's age unsure if he wants to her to be started on AC, will consider further and let us know regarding his decision   - start rate control metoprolol 12.5mg BID with hold parameters   - monitor on telemetry, check TTE, EP consult course c/b new onset afib (on EKG) likely in setting of COVID-19   - [id number] 5 - discussed risks/benefits of AC with nephew Julio Jordan, given patient's age wants to hold off on AC at this time, will consider further and let us know regarding his final decision   - start rate control metoprolol 12.5mg BID with hold parameters   - monitor on telemetry, check TTE, EP consult

## 2021-01-06 NOTE — H&P ADULT - HISTORY OF PRESENT ILLNESS
89 year old Female, Cantonese speaking,  with Hx of HTN presents with cough and intermittent SOB x 2 week.  Patient was in contact with family member (brother) who is COVID +.  Patient denied any fever, chills, nausea, vomiting, diarrhea, chest pain or syncope. Additional information obtained from janee Kim.   In ED, patient with 02 sat 89% on room air, tachypneic up to RR 30. She was placed on supplemental 2L-6L NC with improvement to %.  Patient received ceftriaxone IV, Zithromax IV, decadron 6mg IVP,  and NS bolus 1L in ED.     Significant labs: COVID PCR +, BUN 34 , Creatinine 1.38, BVG lactate 2.7. CXR with peripheral opacities more on the R than L consistent with COVID 19 multifocal PNA.  89 year old Female, Cantonese speaking,  with Hx of HTN presents with cough and intermittent SOB x 2 week.  Patient was in contact with family member (brother) who is COVID +.  Patient denied any fever, chills, nausea, vomiting, diarrhea, chest pain or syncope. Additional information obtained from janee Kim [phone].   In ED, patient with 02 sat 89% on room air, tachypneic up to RR 30. She was placed on supplemental 2L-6L NC with improvement to %.  Patient received ceftriaxone IV, Zithromax IV, decadron 6mg IVP,  and NS bolus 1L in ED.     Significant labs: COVID PCR +, BUN 34 , Creatinine 1.38, BVG lactate 2.7. CXR with peripheral opacities more on the R than L consistent with COVID 19 multifocal PNA.  89 year old Female, Cantonese speaking,  with Hx of HTN presents with non productive cough and intermittent SOB x 2 week.  Patient was in contact with family member (brother) who is COVID +.  Patient denied any fever, chills, nausea, vomiting, diarrhea, chest pain or syncope. Additional information obtained from janee Kim [phone].   In ED, patient with 02 sat 89% on room air, tachypneic up to RR 30. She was placed on supplemental 2L-6L NC with improvement to %.  Patient received ceftriaxone IV, Zithromax IV, decadron 6mg IVP,  and NS bolus 1L in ED.     Significant labs: COVID PCR +, BUN 34 , Creatinine 1.38, BVG lactate 2.7. CXR with peripheral opacities more on the R than L consistent with COVID 19 multifocal PNA.

## 2021-01-07 DIAGNOSIS — U07.1 COVID-19: ICD-10-CM

## 2021-01-07 DIAGNOSIS — R79.89 OTHER SPECIFIED ABNORMAL FINDINGS OF BLOOD CHEMISTRY: ICD-10-CM

## 2021-01-07 DIAGNOSIS — I10 ESSENTIAL (PRIMARY) HYPERTENSION: ICD-10-CM

## 2021-01-07 LAB
ALBUMIN SERPL ELPH-MCNC: 2.7 G/DL — LOW (ref 3.3–5)
ALBUMIN SERPL ELPH-MCNC: 2.9 G/DL — LOW (ref 3.3–5)
ALP SERPL-CCNC: 65 U/L — SIGNIFICANT CHANGE UP (ref 40–120)
ALP SERPL-CCNC: 68 U/L — SIGNIFICANT CHANGE UP (ref 40–120)
ALT FLD-CCNC: 14 U/L — SIGNIFICANT CHANGE UP (ref 4–33)
ALT FLD-CCNC: 20 U/L — SIGNIFICANT CHANGE UP (ref 4–33)
ANION GAP SERPL CALC-SCNC: 12 MMOL/L — SIGNIFICANT CHANGE UP (ref 7–14)
AST SERPL-CCNC: 29 U/L — SIGNIFICANT CHANGE UP (ref 4–32)
AST SERPL-CCNC: 40 U/L — HIGH (ref 4–32)
BILIRUB DIRECT SERPL-MCNC: <0.2 MG/DL — SIGNIFICANT CHANGE UP (ref 0–0.2)
BILIRUB INDIRECT FLD-MCNC: >0.1 MG/DL — SIGNIFICANT CHANGE UP (ref 0–1)
BILIRUB SERPL-MCNC: 0.3 MG/DL — SIGNIFICANT CHANGE UP (ref 0.2–1.2)
BILIRUB SERPL-MCNC: 0.4 MG/DL — SIGNIFICANT CHANGE UP (ref 0.2–1.2)
BUN SERPL-MCNC: 20 MG/DL — SIGNIFICANT CHANGE UP (ref 7–23)
CALCIUM SERPL-MCNC: 9.3 MG/DL — SIGNIFICANT CHANGE UP (ref 8.4–10.5)
CHLORIDE SERPL-SCNC: 104 MMOL/L — SIGNIFICANT CHANGE UP (ref 98–107)
CO2 SERPL-SCNC: 23 MMOL/L — SIGNIFICANT CHANGE UP (ref 22–31)
CREAT SERPL-MCNC: 0.78 MG/DL — SIGNIFICANT CHANGE UP (ref 0.5–1.3)
CREAT SERPL-MCNC: 0.82 MG/DL — SIGNIFICANT CHANGE UP (ref 0.5–1.3)
GLUCOSE BLDC GLUCOMTR-MCNC: 110 MG/DL — HIGH (ref 70–99)
GLUCOSE BLDC GLUCOMTR-MCNC: 175 MG/DL — HIGH (ref 70–99)
GLUCOSE BLDC GLUCOMTR-MCNC: 91 MG/DL — SIGNIFICANT CHANGE UP (ref 70–99)
GLUCOSE BLDC GLUCOMTR-MCNC: 99 MG/DL — SIGNIFICANT CHANGE UP (ref 70–99)
GLUCOSE SERPL-MCNC: 87 MG/DL — SIGNIFICANT CHANGE UP (ref 70–99)
HCT VFR BLD CALC: 38 % — SIGNIFICANT CHANGE UP (ref 34.5–45)
HGB BLD-MCNC: 11.4 G/DL — LOW (ref 11.5–15.5)
LACTATE SERPL-SCNC: 1.4 MMOL/L — SIGNIFICANT CHANGE UP (ref 0.5–2)
MAGNESIUM SERPL-MCNC: 2.3 MG/DL — SIGNIFICANT CHANGE UP (ref 1.6–2.6)
MCHC RBC-ENTMCNC: 28.3 PG — SIGNIFICANT CHANGE UP (ref 27–34)
MCHC RBC-ENTMCNC: 30 GM/DL — LOW (ref 32–36)
MCV RBC AUTO: 94.3 FL — SIGNIFICANT CHANGE UP (ref 80–100)
NRBC # BLD: 0 /100 WBCS — SIGNIFICANT CHANGE UP
NRBC # FLD: 0 K/UL — SIGNIFICANT CHANGE UP
PLATELET # BLD AUTO: 262 K/UL — SIGNIFICANT CHANGE UP (ref 150–400)
POTASSIUM SERPL-MCNC: 4.6 MMOL/L — SIGNIFICANT CHANGE UP (ref 3.5–5.3)
POTASSIUM SERPL-SCNC: 4.6 MMOL/L — SIGNIFICANT CHANGE UP (ref 3.5–5.3)
PROT C ACT/NOR PPP: 96 % — SIGNIFICANT CHANGE UP (ref 74–150)
PROT S FREE AG PPP IA-ACNC: 53 % — LOW (ref 60–131)
PROT S FREE PPP-ACNC: 90 % — SIGNIFICANT CHANGE UP (ref 70–130)
PROT SERPL-MCNC: 6.6 G/DL — SIGNIFICANT CHANGE UP (ref 6–8.3)
PROT SERPL-MCNC: 7.3 G/DL — SIGNIFICANT CHANGE UP (ref 6–8.3)
RBC # BLD: 4.03 M/UL — SIGNIFICANT CHANGE UP (ref 3.8–5.2)
RBC # FLD: 13.9 % — SIGNIFICANT CHANGE UP (ref 10.3–14.5)
SARS-COV-2 IGG SERPL QL IA: POSITIVE
SARS-COV-2 IGM SERPL IA-ACNC: 1.95 INDEX — HIGH
SODIUM SERPL-SCNC: 139 MMOL/L — SIGNIFICANT CHANGE UP (ref 135–145)
T3 SERPL-MCNC: 66 NG/DL — LOW (ref 80–200)
T4 FREE SERPL-MCNC: 1.6 NG/DL — SIGNIFICANT CHANGE UP (ref 0.9–1.8)
TSH SERPL-MCNC: 0.22 UIU/ML — LOW (ref 0.27–4.2)
WBC # BLD: 6.16 K/UL — SIGNIFICANT CHANGE UP (ref 3.8–10.5)
WBC # FLD AUTO: 6.16 K/UL — SIGNIFICANT CHANGE UP (ref 3.8–10.5)

## 2021-01-07 PROCEDURE — 99233 SBSQ HOSP IP/OBS HIGH 50: CPT

## 2021-01-07 PROCEDURE — 99232 SBSQ HOSP IP/OBS MODERATE 35: CPT

## 2021-01-07 PROCEDURE — 93970 EXTREMITY STUDY: CPT | Mod: 26

## 2021-01-07 RX ORDER — METOPROLOL TARTRATE 50 MG
12.5 TABLET ORAL
Refills: 0 | Status: DISCONTINUED | OUTPATIENT
Start: 2021-01-07 | End: 2021-01-16

## 2021-01-07 RX ORDER — METOPROLOL TARTRATE 50 MG
12.5 TABLET ORAL DAILY
Refills: 0 | Status: DISCONTINUED | OUTPATIENT
Start: 2021-01-07 | End: 2021-01-07

## 2021-01-07 RX ORDER — REMDESIVIR 5 MG/ML
100 INJECTION INTRAVENOUS EVERY 24 HOURS
Refills: 0 | Status: COMPLETED | OUTPATIENT
Start: 2021-01-08 | End: 2021-01-10

## 2021-01-07 RX ORDER — REMDESIVIR 5 MG/ML
INJECTION INTRAVENOUS
Refills: 0 | Status: COMPLETED | OUTPATIENT
Start: 2021-01-07 | End: 2021-01-10

## 2021-01-07 RX ORDER — REMDESIVIR 5 MG/ML
200 INJECTION INTRAVENOUS EVERY 24 HOURS
Refills: 0 | Status: COMPLETED | OUTPATIENT
Start: 2021-01-07 | End: 2021-01-07

## 2021-01-07 RX ADMIN — Medication 12.5 MILLIGRAM(S): at 18:31

## 2021-01-07 RX ADMIN — Medication 12.5 MILLIGRAM(S): at 04:30

## 2021-01-07 RX ADMIN — Medication 6 MILLIGRAM(S): at 04:30

## 2021-01-07 RX ADMIN — REMDESIVIR 500 MILLIGRAM(S): 5 INJECTION INTRAVENOUS at 18:32

## 2021-01-07 RX ADMIN — ENOXAPARIN SODIUM 40 MILLIGRAM(S): 100 INJECTION SUBCUTANEOUS at 12:12

## 2021-01-07 NOTE — PROGRESS NOTE ADULT - ASSESSMENT
89 year old Female, Cantonese speaking with Hx of HTN presented with non productive cough, intermittent SOB x 2 week and + COVID. Found to have paroxysmal atrial fibrillation with asymptomatic conversion pauses up to 2.3 secs. Currently in sinus rhythm and is started on low dose metoprolol. Patient is on Lovenox trial. CHADs2 Vasc score=4; age; sex, HTN  - Continue metoprolol 12.5mg BID  - Recommend anticoagulation with apixaban 2.5 mg BID or dose required for systemic anticoagulation with Lovenox is 60mg BID ( 1mg/kg two times/day). Patient's stroke risk is 4.8% per year in >90,000 patients (the [place] Atrial Fibrillation Cohort Study) and has 6.7% risk of stroke/TIA/systemic embolism.  - Continue to monitor on telemetry symptomatic bradycardia or pauses  - Maintain K+~4.0 and Mg++~2.0  - Check TFTs  - Echocardiogram to complete cardiac work up  - Continue other management as per primary team  - Discussed with Dr. Giron  - Will follow up     89 year old Female, Cantonese speaking with Hx of HTN presented with non productive cough, intermittent SOB x 2 week and + COVID. Found to have paroxysmal atrial fibrillation with RVR (130's) and  asymptomatic conversion pauses to 2.3 seconds. Started on low dose metoprolol 12.5mg bid for rate control and now sinus kashif (40-50's) but chronotropically competent. BP stable.  Concern that her increased lethargy today may in part be due to the  beta blocker.       Patient is on Lovenox trial. CHADs2 Vasc score=4; age; sex, HTN  Plan:   - I would continue the metoprolol 12.5mg bid as I do not think this is the cause of her lethargy. Would look for other causes.  Additionally, she had   PVC's/bigeminy later today and the beta blocker might help with ectopy suppression.   - Recommend anticoagulation with apixaban 2.5 mg BID or therapeutic Lovenox dosing.  Patient's stroke risk is 4.8% per year in >90,000 patients (the [place] Atrial Fibrillation Cohort Study) and has 6.7% risk of stroke/TIA/systemic embolism.  - Continue to monitor on telemetry symptomatic bradycardia or pauses  - Maintain K+~4.0 and Mg++~2.0  - Check TFTs  - Echocardiogram to complete cardiac work up  - Continue other management as per primary team  - Discussed with Dr. Giron  - Will follow up

## 2021-01-07 NOTE — DISCHARGE NOTE PROVIDER - NSDCFUADDAPPT_GEN_ALL_CORE_FT
If you are in need of a general medicine physician and post-discharge medical follow-up for further care/recommendations you may contact the [facility] for an appointment [phone]

## 2021-01-07 NOTE — PHYSICAL THERAPY INITIAL EVALUATION ADULT - PERTINENT HX OF CURRENT PROBLEM, REHAB EVAL
This is a 89F, Cantonese-speaking, who presents with cough and SOB, a/w acute hypoxic respiratory failure 2/2 COVID-19. Course c/b new onset afib.

## 2021-01-07 NOTE — PHYSICAL THERAPY INITIAL EVALUATION ADULT - GENERAL OBSERVATIONS, REHAB EVAL
Patient received semi supine in bed , (+) non rebreather mask at 15LO2 , (+) tele monitor , frequent coughing with mild shortness of breath subsided at rest. Patient received semi supine in bed , (+) non rebreather mask  , (+) tele monitor , frequent coughing with mild shortness of breath subsided at rest.

## 2021-01-07 NOTE — PROGRESS NOTE ADULT - ASSESSMENT
89 yo woman admitted with respiratory failure due to COVID19 pneumonia and new onset atrial fibrillation.

## 2021-01-07 NOTE — DISCHARGE NOTE PROVIDER - NSDCFUADDINST_GEN_ALL_CORE_FT
You were diagnosed COVID (+) on 1/14    Call your primary care provider within 1 - 2 days for further management recommendations and to schedule a follow up appointment. If you do not have one, you can call [phone] to establish care with our clinics.     Should you require more information, please call our coronavirus specialists at [phone] ([phone]).    Those caring for the patient should maintain strict hand hygiene and avoid touching face as much as possible. If any members develop shortness of breath or fevers they should contact their primary care provider as soon as possible

## 2021-01-07 NOTE — DISCHARGE NOTE PROVIDER - NSDCMRMEDTOKEN_GEN_ALL_CORE_FT
acetaminophen 325 mg oral tablet: 2 tab(s) orally every 6 hours, As needed, Temp greater or equal to 38C (100.4F), Mild Pain (1 - 3), Moderate Pain (4 - 6)  albuterol 90 mcg/inh inhalation aerosol: 2 puff(s) inhaled every 4 hours, As needed, Shortness of Breath and/or Wheezing  benzonatate 100 mg oral capsule: 1 cap(s) orally 3 times a day, As needed, Cough  enoxaparin: 40 milligram(s) subcutaneous once a day  insulin lispro (concentrated) 200 units/mL subcutaneous solution: subcutaneous 4 times a day (before meals and at bedtime) - 1 Unit(s) if Glucose 151 - 200  2 Unit(s) if Glucose 201 - 250  3 Unit(s) if Glucose 251 - 300  4 Unit(s) if Glucose 301 - 350  5 Unit(s) if Glucose 351 - 400  6 Unit(s) if Glucose Greater Than 400  metoprolol: 12.5 milligram(s) injectable 2 times a day- hold SBP &lt; 100, HR &lt; 60  ocular lubricant ophthalmic solution: 1 drop(s) to each affected eye every 4 hours, As needed, Dry Eyes  polyethylene glycol 3350 oral powder for reconstitution: 17 gram(s) orally once a day, As needed, Constipation  senna oral tablet: 2 tab(s) orally once a day (at bedtime)- hold loose stool

## 2021-01-07 NOTE — PROGRESS NOTE ADULT - ATTENDING COMMENTS
89 year old Female, Cantonese speaking with Hx of HTN presented with non productive cough, intermittent SOB x 2 week and + COVID. Found to have paroxysmal atrial fibrillation with RVR (130's) and  asymptomatic conversion pauses to 2.3 seconds. Started on low dose metoprolol 12.5mg bid for rate control and now sinus kashif (40-50's) but chronotropically competent. BP stable.  Concern that her increased lethargy today may in part be due to the  beta blocker.       Patient is on Lovenox trial. CHADs2 Vasc score=4; age; sex, HTN  Plan:   - I would continue the metoprolol 12.5mg bid as I do not think this is the cause of her lethargy. Would look for other causes.  Additionally, she had   PVC's/bigeminy later today and the beta blocker might help with ectopy suppression.   - Recommend anticoagulation with apixaban 2.5 mg BID or therapeutic Lovenox dosing.  Patient's stroke risk is 4.8% per year in >90,000 patients (the [place] Atrial Fibrillation Cohort Study) and has 6.7% risk of stroke/TIA/systemic embolism.  - Continue to monitor on telemetry symptomatic bradycardia or pauses  - Maintain K+~4.0 and Mg++~2.0  - Check TFTs  - Echocardiogram to complete cardiac work up  - Continue other management as per primary team

## 2021-01-07 NOTE — PHYSICAL THERAPY INITIAL EVALUATION ADULT - ADDITIONAL COMMENTS
Partial Purse String (Simple) Text: Given the location of the defect and the characteristics of the surrounding skin a simple purse string closure was deemed most appropriate.  Undermining was performed circumferentially around the surgical defect.  A purse string suture was then placed and tightened. Wound tension of the circular defect prevented complete closure of the wound. Information obtained from the patient using [employer]  , however as per Care Coordinator's note, patient has Home Health Aide services 6hrs x 7 days.

## 2021-01-07 NOTE — DISCHARGE NOTE PROVIDER - PROVIDER TOKENS
FREE:[LAST:[Your PCP],PHONE:[(   )    -],FAX:[(   )    -],ADDRESS:[Follow up within 1 week]],FREE:[LAST:[Your Cardiologist],PHONE:[(   )    -],FAX:[(   )    -],ADDRESS:[Follow up within 1 week]]

## 2021-01-07 NOTE — PROGRESS NOTE ADULT - PROBLEM SELECTOR PLAN 4
Rate controlled. Some bradycardia on metoprolol   Continue metoprolol per EP cardiology   FU on TTE read  BMP  EP following  Mg level

## 2021-01-07 NOTE — DISCHARGE NOTE PROVIDER - CARE PROVIDER_API CALL
Your PCP,   Follow up within 1 week  Phone: (   )    -  Fax: (   )    -  Follow Up Time:     Your Cardiologist,   Follow up within 1 week  Phone: (   )    -  Fax: (   )    -  Follow Up Time:

## 2021-01-07 NOTE — DISCHARGE NOTE PROVIDER - NSDCCPCAREPLAN_GEN_ALL_CORE_FT
PRINCIPAL DISCHARGE DIAGNOSIS  Diagnosis: COVID-19  Assessment and Plan of Treatment:        PRINCIPAL DISCHARGE DIAGNOSIS  Diagnosis: COVID-19  Assessment and Plan of Treatment: You have been diagnosed with the COVID-19 virus during your hospital stay. You must self quarantine to complete a 14 day time period.  Monitor for fevers, shortness of breath and cough primarily.  Monitor your temperature daily to not any changes and increases.    It has been determined that you no longer need hospitalization and can recover while remaining in self-quarantine at home. You should follow the prevention steps below until a healthcare provider or local or state health department says you can return to your normal activities.  1. You should restrict activities outside your home, except for getting medical care.  2. Do not go to work, school, or public areas.  3. Avoid using public transportation, ride-sharing, or taxis.  4. Separate yourself from other people and animals in your home.  People: As much as possible, you should stay in a specific room and away from other people in your home. Also, you should use a separate bathroom, if available.  Animals: You should restrict contact with pets and other animals while you are sick with COVID-19, just like you would around other people. Although there have not been reports of pets or other animals becoming sick with COVID-19, it is still recommended that people sick with COVID-19 limit contact with animals until more information is known about the virus.  When possible, have another member of your household care for your animals while you are sick. If you must care for your pet or be around animals while you are sick, wash your hands before and after you interact with pets and wear a facemask.  5. Call ahead before visiting your doctor.  If you have a medical appointment, call the healthcare provider and tell them that you have or may have COVID-19. This will help the healthcare provider’s office take steps to keep other people from getting infected or exposed.  6. Wear a facemask.  You should wear a facemask when you are around other people (e.g., sharing a room or vehicle) or pets and before you enter a healthcare pro      SECONDARY DISCHARGE DIAGNOSES  Diagnosis: TIMA (acute kidney injury)  Assessment and Plan of Treatment: In order to prevent further disease progression, continue to follow recommendations made by your primary provider/nephrologist. Continue a diet that is low in sodium and avoid foods that are concentrated in potassium and phosphorus. Continue your medications/supplementations as directed and avoid over-the-counter drugs that are harmful to kidneys, such as, Non-Steroidal Anti-Inflammatory Drugs (NSAIDs). Follow-up as outpatient to monitor your kidney function, as well as, vitamin D, Calcium, potassium, and phosphorus levels.      Diagnosis: Abnormal thyroid stimulating hormone (TSH) level  Assessment and Plan of Treatment: TSH (thyroid stimulating hormone) was low, Free T4 normal, T3 low, pleaase repeat Thyroid function tests as outpatient with your PCP in 1month to 6 weeks    Diagnosis: T2DM (type 2 diabetes mellitus)  Assessment and Plan of Treatment: Continue your medication regimen and a consistent carbohydrate diet (Meaning eating the same amount of carbohydrates at the same time each day). Monitor blood glucose levels throughout the day before meals and at bedtime. Record blood sugars and bring to outpatient providers appointment in order to be reviewed by your doctor for management modifications. If your sugars are more than 400 or less than 70 you should contact your PCP immediately. Monitor for signs/symptoms of low blood glucose, such as, dizziness, altered mental status, or cool/clammy skin. In addition, monitor for signs/symptoms of high blood glucose, such as, feeling hot, dry, fatigued, or with increased thirst/urination. Make regular podiatry appointments in order to have feet checked for wounds and uncontrolled toe nail growth to prevent infections, as well as, appointments with an ophthalmologist to monitor your vision.      Diagnosis: Atrial fibrillation  Assessment and Plan of Treatment: Please continue your medications as directed and follow-up with your primary provider/cardiologist to further manage your care. Monitor for signs/symptoms of uncontrolled atrial fibrillation, such as, increased heart rate, palpitations, chest pain, dizziness, or shortness of breath - Return to emergency room if these signs/symptoms are present.      Diagnosis: Essential hypertension  Assessment and Plan of Treatment: Monitor for any visual changes, headaches or dizziness.  Monitor blood pressure regularly.  Follow up with your PCP for further management for high blood pressure.

## 2021-01-07 NOTE — DISCHARGE NOTE PROVIDER - HOSPITAL COURSE
89 y/o F with hx of HTN presenting with c/o cough and intermittent SOB x 2 weeks.    Hospital Course  COVID-19  - COVID positive 1/5 and 1/15, c/b hypoxic respiratory failure  - s/p decadron & Remdesivir   - BCX 1/6- neg  - Received supportive care with PRN cough medications, Tylenol, albuterol   -stable O2 sats on RA at time of discharge    AFIB  - EP consulted 1/6 for LEODAN  - Continue w/ metoprolol 12.5mg BID    - Recommend anticoagulation with apixaban 2.5 mg BID or dose required for systemic anticoagulation with Lovenox is 60mg BID ( 1mg/kg two times/day). Patient's stroke risk is 4.8% per year in >90,000 patients (the [place] Atrial Fibrillation Cohort Study) and has 6.7% risk of stroke/TIA/systemic embolism. However Family does not want AC as per discussion   -Monitored on telemetry  - Maintained K+~4.0 and Mg++~2.0  - VA Duplex neg ; CTA neg PE   - TTE EF 60 % Normal LV; RV Not well visualized    TIMA (acute kidney injury)  - s/p IV hydration   -Improved. Cr 0.94 on day of discharge    Hypertension  -BP stable   - not on any home medications    T2DM (type 2 diabetes mellitus)  -A1C 6.7%   - not on any home medications  - Received SSI/FS qac and hs    Dispo- On 1/15 this case was reviewed with Dr. Reid, the patient is medically stable and optimized for discharge. All medications were reviewed and prescriptions were sent to mutually agreed upon pharmacy.

## 2021-01-08 LAB
ALBUMIN SERPL ELPH-MCNC: 2.8 G/DL — LOW (ref 3.3–5)
ALP SERPL-CCNC: 61 U/L — SIGNIFICANT CHANGE UP (ref 40–120)
ALT FLD-CCNC: 19 U/L — SIGNIFICANT CHANGE UP (ref 4–33)
ANION GAP SERPL CALC-SCNC: 10 MMOL/L — SIGNIFICANT CHANGE UP (ref 7–14)
AST SERPL-CCNC: 31 U/L — SIGNIFICANT CHANGE UP (ref 4–32)
BILIRUB SERPL-MCNC: <0.2 MG/DL — SIGNIFICANT CHANGE UP (ref 0.2–1.2)
BUN SERPL-MCNC: 32 MG/DL — HIGH (ref 7–23)
CALCIUM SERPL-MCNC: 9.7 MG/DL — SIGNIFICANT CHANGE UP (ref 8.4–10.5)
CHLORIDE SERPL-SCNC: 107 MMOL/L — SIGNIFICANT CHANGE UP (ref 98–107)
CO2 SERPL-SCNC: 25 MMOL/L — SIGNIFICANT CHANGE UP (ref 22–31)
CREAT SERPL-MCNC: 0.85 MG/DL — SIGNIFICANT CHANGE UP (ref 0.5–1.3)
D DIMER BLD IA.RAPID-MCNC: 7957 NG/ML DDU — HIGH
GLUCOSE BLDC GLUCOMTR-MCNC: 132 MG/DL — HIGH (ref 70–99)
GLUCOSE BLDC GLUCOMTR-MCNC: 136 MG/DL — HIGH (ref 70–99)
GLUCOSE BLDC GLUCOMTR-MCNC: 148 MG/DL — HIGH (ref 70–99)
GLUCOSE BLDC GLUCOMTR-MCNC: 209 MG/DL — HIGH (ref 70–99)
GLUCOSE SERPL-MCNC: 122 MG/DL — HIGH (ref 70–99)
HCT VFR BLD CALC: 39.2 % — SIGNIFICANT CHANGE UP (ref 34.5–45)
HGB BLD-MCNC: 11.6 G/DL — SIGNIFICANT CHANGE UP (ref 11.5–15.5)
MCHC RBC-ENTMCNC: 28 PG — SIGNIFICANT CHANGE UP (ref 27–34)
MCHC RBC-ENTMCNC: 29.6 GM/DL — LOW (ref 32–36)
MCV RBC AUTO: 94.5 FL — SIGNIFICANT CHANGE UP (ref 80–100)
NRBC # BLD: 0 /100 WBCS — SIGNIFICANT CHANGE UP
NRBC # FLD: 0 K/UL — SIGNIFICANT CHANGE UP
PLATELET # BLD AUTO: 310 K/UL — SIGNIFICANT CHANGE UP (ref 150–400)
POTASSIUM SERPL-MCNC: 4.9 MMOL/L — SIGNIFICANT CHANGE UP (ref 3.5–5.3)
POTASSIUM SERPL-SCNC: 4.9 MMOL/L — SIGNIFICANT CHANGE UP (ref 3.5–5.3)
PROT SERPL-MCNC: 6.7 G/DL — SIGNIFICANT CHANGE UP (ref 6–8.3)
RBC # BLD: 4.15 M/UL — SIGNIFICANT CHANGE UP (ref 3.8–5.2)
RBC # FLD: 13.7 % — SIGNIFICANT CHANGE UP (ref 10.3–14.5)
SODIUM SERPL-SCNC: 142 MMOL/L — SIGNIFICANT CHANGE UP (ref 135–145)
WBC # BLD: 4.45 K/UL — SIGNIFICANT CHANGE UP (ref 3.8–10.5)
WBC # FLD AUTO: 4.45 K/UL — SIGNIFICANT CHANGE UP (ref 3.8–10.5)

## 2021-01-08 PROCEDURE — 99233 SBSQ HOSP IP/OBS HIGH 50: CPT

## 2021-01-08 PROCEDURE — 93306 TTE W/DOPPLER COMPLETE: CPT | Mod: 26

## 2021-01-08 PROCEDURE — 71275 CT ANGIOGRAPHY CHEST: CPT | Mod: 26

## 2021-01-08 RX ADMIN — ENOXAPARIN SODIUM 40 MILLIGRAM(S): 100 INJECTION SUBCUTANEOUS at 12:14

## 2021-01-08 RX ADMIN — Medication 12.5 MILLIGRAM(S): at 18:36

## 2021-01-08 RX ADMIN — Medication 2: at 12:13

## 2021-01-08 RX ADMIN — Medication 6 MILLIGRAM(S): at 05:46

## 2021-01-08 RX ADMIN — REMDESIVIR 500 MILLIGRAM(S): 5 INJECTION INTRAVENOUS at 18:35

## 2021-01-08 NOTE — PROGRESS NOTE ADULT - ASSESSMENT
89 yo woman admitted with respiratory failure due to COVID19 pneumonia and new onset atrial fibrillation.

## 2021-01-08 NOTE — CHART NOTE - NSCHARTNOTEFT_GEN_A_CORE
Telemetry reviewed and shows sinus rhythm with HR 50s-80s. HR noted to be in 40s and down to 36 during sleeping hours. HR 80s when awake and resting in bed. No acute distress noted. Telemetry reviewed and shows sinus rhythm with HR 50s-80s. HR noted to be in 40s and down to 36 during sleeping hours. HR 80s when awake and resting in bed. No acute distress noted.  Plan:   - I would continue the metoprolol 12.5mg bid  - Recommend anticoagulation with apixaban 2.5 mg BID or therapeutic Lovenox dosing.  Patient's stroke risk is 4.8% per year in >90,000 patients (the [place] Atrial Fibrillation Cohort Study) and has 6.7% risk of stroke/TIA/systemic embolism. Telemetry reviewed and shows sinus rhythm with HR 50s-80s. HR noted to be in 40s and down to 36 during sleeping hours. HR 80s when awake and resting in bed. No acute distress noted.  Plan:   - continue the metoprolol 12.5mg bid  - Recommend anticoagulation with apixaban 2.5 mg BID or therapeutic Lovenox dosing.  Patient's stroke risk is 4.8% per year in >90,000 patients (the [place] Atrial Fibrillation Cohort Study) and has 6.7% risk of stroke/TIA/systemic embolism.

## 2021-01-08 NOTE — PATIENT PROFILE ADULT - NSPROPTRIGHTNOTIFY_GEN_A_NUR
Discharge Summary    Reason for Hospitalization:  See below    Admission and Discharge Diagnoses:  See below    Course in Hospital:  This patient underwent vaginal delivery over small midline episiotomy September 6, 2019.  She delivered a little girl named Bev.  Her postpartum hemoglobin is 9.7, white count 8000, and her most recent vital signs show a blood pressure of 142/93.  We do not have her on any antihypertensive medications.  She is doing well and she is dismissed in stable condition.  We will see her in the office to check her blood pressure and do her first postpartum visit in 2 weeks.  I am going to give her Colace, ibuprofen.  Her discharge diagnosis is PIH, term pregnancy.  Vaginal delivery.    Discharge Condition:  Stable.    Discharge Instructions and Plans for Follow-Up:  As above    Discharge Prescriptions:  As above  Jair Lizarraga, February 20, 1996   declines

## 2021-01-08 NOTE — CHART NOTE - NSCHARTNOTEFT_GEN_A_CORE
called nephew to update on patients care and planning Julio- and tried  as well- no answer  at either number d/w Attending and RN called nephew to update on patients care and planning Julio- and tried  as well- no answer  at either number d/w Attending and RN    addressed AC with family again today during facetime call-   Explained to family at patient if patient has PE will need AC,   family  in agreement with potential AC plan if CT shows PE  but would like to speak to provider before proceeding with anticoagulation  f/u w/ family after CTA resulted (Test has been expedited)     d/w Attending and RN

## 2021-01-08 NOTE — PROGRESS NOTE ADULT - PROBLEM SELECTOR PLAN 4
Rate controlled. Some bradycardia on metoprolol   Continue metoprolol per EP cardiology   FU on TTE read  BMP  EP following  Mg level  No AC per patient's family

## 2021-01-09 LAB
GLUCOSE BLDC GLUCOMTR-MCNC: 131 MG/DL — HIGH (ref 70–99)
GLUCOSE BLDC GLUCOMTR-MCNC: 148 MG/DL — HIGH (ref 70–99)
GLUCOSE BLDC GLUCOMTR-MCNC: 187 MG/DL — HIGH (ref 70–99)
GLUCOSE BLDC GLUCOMTR-MCNC: 191 MG/DL — HIGH (ref 70–99)

## 2021-01-09 PROCEDURE — 99233 SBSQ HOSP IP/OBS HIGH 50: CPT

## 2021-01-09 RX ADMIN — ENOXAPARIN SODIUM 40 MILLIGRAM(S): 100 INJECTION SUBCUTANEOUS at 12:58

## 2021-01-09 RX ADMIN — Medication 12.5 MILLIGRAM(S): at 18:39

## 2021-01-09 RX ADMIN — REMDESIVIR 500 MILLIGRAM(S): 5 INJECTION INTRAVENOUS at 15:26

## 2021-01-09 RX ADMIN — Medication 1: at 12:58

## 2021-01-09 RX ADMIN — Medication 12.5 MILLIGRAM(S): at 04:14

## 2021-01-09 RX ADMIN — Medication 6 MILLIGRAM(S): at 04:14

## 2021-01-09 RX ADMIN — Medication 100 MILLIGRAM(S): at 15:26

## 2021-01-09 NOTE — PROGRESS NOTE ADULT - PROBLEM SELECTOR PLAN 4
Rate controlled. Some bradycardia on metoprolol   Continue metoprolol per EP cardiology   FU on TTE read  BMP  EP following  Mg level  No AC per patient's family wishes (nephew to be exact)

## 2021-01-10 LAB
GLUCOSE BLDC GLUCOMTR-MCNC: 114 MG/DL — HIGH (ref 70–99)
GLUCOSE BLDC GLUCOMTR-MCNC: 132 MG/DL — HIGH (ref 70–99)
GLUCOSE BLDC GLUCOMTR-MCNC: 134 MG/DL — HIGH (ref 70–99)
GLUCOSE BLDC GLUCOMTR-MCNC: 293 MG/DL — HIGH (ref 70–99)

## 2021-01-10 PROCEDURE — 99232 SBSQ HOSP IP/OBS MODERATE 35: CPT

## 2021-01-10 RX ORDER — POLYETHYLENE GLYCOL 3350 17 G/17G
17 POWDER, FOR SOLUTION ORAL DAILY
Refills: 0 | Status: DISCONTINUED | OUTPATIENT
Start: 2021-01-10 | End: 2021-01-16

## 2021-01-10 RX ORDER — HYDRALAZINE HCL 50 MG
10 TABLET ORAL ONCE
Refills: 0 | Status: COMPLETED | OUTPATIENT
Start: 2021-01-10 | End: 2021-01-10

## 2021-01-10 RX ORDER — SENNA PLUS 8.6 MG/1
2 TABLET ORAL AT BEDTIME
Refills: 0 | Status: DISCONTINUED | OUTPATIENT
Start: 2021-01-10 | End: 2021-01-16

## 2021-01-10 RX ADMIN — Medication 6 MILLIGRAM(S): at 04:53

## 2021-01-10 RX ADMIN — Medication 3: at 12:37

## 2021-01-10 RX ADMIN — Medication 12.5 MILLIGRAM(S): at 09:46

## 2021-01-10 RX ADMIN — REMDESIVIR 500 MILLIGRAM(S): 5 INJECTION INTRAVENOUS at 14:16

## 2021-01-10 RX ADMIN — Medication 100 MILLIGRAM(S): at 18:19

## 2021-01-10 RX ADMIN — Medication 100 MILLIGRAM(S): at 09:46

## 2021-01-10 RX ADMIN — ENOXAPARIN SODIUM 40 MILLIGRAM(S): 100 INJECTION SUBCUTANEOUS at 12:40

## 2021-01-11 LAB
ALBUMIN SERPL ELPH-MCNC: 2.9 G/DL — LOW (ref 3.3–5)
ALP SERPL-CCNC: 59 U/L — SIGNIFICANT CHANGE UP (ref 40–120)
ALT FLD-CCNC: 29 U/L — SIGNIFICANT CHANGE UP (ref 4–33)
ANION GAP SERPL CALC-SCNC: 10 MMOL/L — SIGNIFICANT CHANGE UP (ref 7–14)
AST SERPL-CCNC: 38 U/L — HIGH (ref 4–32)
BASOPHILS # BLD AUTO: 0.01 K/UL — SIGNIFICANT CHANGE UP (ref 0–0.2)
BASOPHILS NFR BLD AUTO: 0.1 % — SIGNIFICANT CHANGE UP (ref 0–2)
BILIRUB DIRECT SERPL-MCNC: <0.2 MG/DL — SIGNIFICANT CHANGE UP (ref 0–0.2)
BILIRUB INDIRECT FLD-MCNC: >0.3 MG/DL — SIGNIFICANT CHANGE UP (ref 0–1)
BILIRUB SERPL-MCNC: 0.5 MG/DL — SIGNIFICANT CHANGE UP (ref 0.2–1.2)
BUN SERPL-MCNC: 29 MG/DL — HIGH (ref 7–23)
CALCIUM SERPL-MCNC: 10.2 MG/DL — SIGNIFICANT CHANGE UP (ref 8.4–10.5)
CHLORIDE SERPL-SCNC: 105 MMOL/L — SIGNIFICANT CHANGE UP (ref 98–107)
CO2 SERPL-SCNC: 31 MMOL/L — SIGNIFICANT CHANGE UP (ref 22–31)
CREAT SERPL-MCNC: 0.73 MG/DL — SIGNIFICANT CHANGE UP (ref 0.5–1.3)
CRP SERPL-MCNC: 15.2 MG/L — HIGH
CULTURE RESULTS: SIGNIFICANT CHANGE UP
CULTURE RESULTS: SIGNIFICANT CHANGE UP
D DIMER BLD IA.RAPID-MCNC: 5940 NG/ML DDU — HIGH
EOSINOPHIL # BLD AUTO: 0.03 K/UL — SIGNIFICANT CHANGE UP (ref 0–0.5)
EOSINOPHIL NFR BLD AUTO: 0.3 % — SIGNIFICANT CHANGE UP (ref 0–6)
FIBRINOGEN PPP-MCNC: 371 MG/DL — SIGNIFICANT CHANGE UP (ref 290–520)
GLUCOSE BLDC GLUCOMTR-MCNC: 144 MG/DL — HIGH (ref 70–99)
GLUCOSE BLDC GLUCOMTR-MCNC: 150 MG/DL — HIGH (ref 70–99)
GLUCOSE BLDC GLUCOMTR-MCNC: 161 MG/DL — HIGH (ref 70–99)
GLUCOSE BLDC GLUCOMTR-MCNC: 233 MG/DL — HIGH (ref 70–99)
GLUCOSE SERPL-MCNC: 129 MG/DL — HIGH (ref 70–99)
HCT VFR BLD CALC: 41.9 % — SIGNIFICANT CHANGE UP (ref 34.5–45)
HGB BLD-MCNC: 12.9 G/DL — SIGNIFICANT CHANGE UP (ref 11.5–15.5)
IANC: 7.57 K/UL — SIGNIFICANT CHANGE UP (ref 1.5–8.5)
IMM GRANULOCYTES NFR BLD AUTO: 0.5 % — SIGNIFICANT CHANGE UP (ref 0–1.5)
LYMPHOCYTES # BLD AUTO: 0.51 K/UL — LOW (ref 1–3.3)
LYMPHOCYTES # BLD AUTO: 5.9 % — LOW (ref 13–44)
MAGNESIUM SERPL-MCNC: 2.1 MG/DL — SIGNIFICANT CHANGE UP (ref 1.6–2.6)
MCHC RBC-ENTMCNC: 28.2 PG — SIGNIFICANT CHANGE UP (ref 27–34)
MCHC RBC-ENTMCNC: 30.8 GM/DL — LOW (ref 32–36)
MCV RBC AUTO: 91.5 FL — SIGNIFICANT CHANGE UP (ref 80–100)
MONOCYTES # BLD AUTO: 0.46 K/UL — SIGNIFICANT CHANGE UP (ref 0–0.9)
MONOCYTES NFR BLD AUTO: 5.3 % — SIGNIFICANT CHANGE UP (ref 2–14)
NEUTROPHILS # BLD AUTO: 7.57 K/UL — HIGH (ref 1.8–7.4)
NEUTROPHILS NFR BLD AUTO: 87.9 % — HIGH (ref 43–77)
NRBC # BLD: 0 /100 WBCS — SIGNIFICANT CHANGE UP
NRBC # FLD: 0 K/UL — SIGNIFICANT CHANGE UP
PHOSPHATE SERPL-MCNC: 3 MG/DL — SIGNIFICANT CHANGE UP (ref 2.5–4.5)
PLATELET # BLD AUTO: 316 K/UL — SIGNIFICANT CHANGE UP (ref 150–400)
POTASSIUM SERPL-MCNC: 5.1 MMOL/L — SIGNIFICANT CHANGE UP (ref 3.5–5.3)
POTASSIUM SERPL-SCNC: 5.1 MMOL/L — SIGNIFICANT CHANGE UP (ref 3.5–5.3)
PROT SERPL-MCNC: 6.4 G/DL — SIGNIFICANT CHANGE UP (ref 6–8.3)
RBC # BLD: 4.58 M/UL — SIGNIFICANT CHANGE UP (ref 3.8–5.2)
RBC # FLD: 13.7 % — SIGNIFICANT CHANGE UP (ref 10.3–14.5)
SODIUM SERPL-SCNC: 146 MMOL/L — HIGH (ref 135–145)
SPECIMEN SOURCE: SIGNIFICANT CHANGE UP
SPECIMEN SOURCE: SIGNIFICANT CHANGE UP
TROPONIN T, HIGH SENSITIVITY RESULT: 17 NG/L — SIGNIFICANT CHANGE UP
WBC # BLD: 8.62 K/UL — SIGNIFICANT CHANGE UP (ref 3.8–10.5)
WBC # FLD AUTO: 8.62 K/UL — SIGNIFICANT CHANGE UP (ref 3.8–10.5)

## 2021-01-11 PROCEDURE — 99233 SBSQ HOSP IP/OBS HIGH 50: CPT

## 2021-01-11 RX ORDER — SODIUM CHLORIDE 9 MG/ML
500 INJECTION, SOLUTION INTRAVENOUS
Refills: 0 | Status: DISCONTINUED | OUTPATIENT
Start: 2021-01-11 | End: 2021-01-13

## 2021-01-11 RX ADMIN — Medication 2: at 17:59

## 2021-01-11 RX ADMIN — Medication 12.5 MILLIGRAM(S): at 04:39

## 2021-01-11 RX ADMIN — SODIUM CHLORIDE 50 MILLILITER(S): 9 INJECTION, SOLUTION INTRAVENOUS at 20:46

## 2021-01-11 RX ADMIN — Medication 10 MILLIGRAM(S): at 00:33

## 2021-01-11 RX ADMIN — ENOXAPARIN SODIUM 40 MILLIGRAM(S): 100 INJECTION SUBCUTANEOUS at 12:33

## 2021-01-11 RX ADMIN — Medication 12.5 MILLIGRAM(S): at 17:59

## 2021-01-11 RX ADMIN — Medication 6 MILLIGRAM(S): at 04:39

## 2021-01-11 NOTE — CHART NOTE - NSCHARTNOTEFT_GEN_A_CORE
Telemetry reviewed and shows sinus rhythm with HR 40s-60s when sleeping. HR up to 80s when awake and resting in bed

## 2021-01-11 NOTE — PROGRESS NOTE ADULT - PROBLEM SELECTOR PLAN 4
Rate controlled. Some bradycardia on metoprolol   Continue metoprolol per EP cardiology   FU on TTE read  BMP  EP following  Mg level  No AC per patient's family wishes (nephew to be exact)   Nl left ventricular function

## 2021-01-12 LAB
ALBUMIN SERPL ELPH-MCNC: 2.7 G/DL — LOW (ref 3.3–5)
ALP SERPL-CCNC: 53 U/L — SIGNIFICANT CHANGE UP (ref 40–120)
ALT FLD-CCNC: 20 U/L — SIGNIFICANT CHANGE UP (ref 4–33)
ANION GAP SERPL CALC-SCNC: 9 MMOL/L — SIGNIFICANT CHANGE UP (ref 7–14)
AST SERPL-CCNC: 23 U/L — SIGNIFICANT CHANGE UP (ref 4–32)
BASOPHILS # BLD AUTO: 0 K/UL — SIGNIFICANT CHANGE UP (ref 0–0.2)
BASOPHILS NFR BLD AUTO: 0 % — SIGNIFICANT CHANGE UP (ref 0–2)
BILIRUB DIRECT SERPL-MCNC: <0.2 MG/DL — SIGNIFICANT CHANGE UP (ref 0–0.2)
BILIRUB INDIRECT FLD-MCNC: >0.5 MG/DL — SIGNIFICANT CHANGE UP (ref 0–1)
BILIRUB SERPL-MCNC: 0.7 MG/DL — SIGNIFICANT CHANGE UP (ref 0.2–1.2)
BUN SERPL-MCNC: 27 MG/DL — HIGH (ref 7–23)
CALCIUM SERPL-MCNC: 9.6 MG/DL — SIGNIFICANT CHANGE UP (ref 8.4–10.5)
CHLORIDE SERPL-SCNC: 99 MMOL/L — SIGNIFICANT CHANGE UP (ref 98–107)
CO2 SERPL-SCNC: 30 MMOL/L — SIGNIFICANT CHANGE UP (ref 22–31)
CREAT SERPL-MCNC: 0.7 MG/DL — SIGNIFICANT CHANGE UP (ref 0.5–1.3)
EOSINOPHIL # BLD AUTO: 0 K/UL — SIGNIFICANT CHANGE UP (ref 0–0.5)
EOSINOPHIL NFR BLD AUTO: 0 % — SIGNIFICANT CHANGE UP (ref 0–6)
GLUCOSE BLDC GLUCOMTR-MCNC: 168 MG/DL — HIGH (ref 70–99)
GLUCOSE BLDC GLUCOMTR-MCNC: 197 MG/DL — HIGH (ref 70–99)
GLUCOSE BLDC GLUCOMTR-MCNC: 333 MG/DL — HIGH (ref 70–99)
GLUCOSE SERPL-MCNC: 185 MG/DL — HIGH (ref 70–99)
HCT VFR BLD CALC: 39.7 % — SIGNIFICANT CHANGE UP (ref 34.5–45)
HGB BLD-MCNC: 12.6 G/DL — SIGNIFICANT CHANGE UP (ref 11.5–15.5)
IANC: 3.07 K/UL — SIGNIFICANT CHANGE UP (ref 1.5–8.5)
IMM GRANULOCYTES NFR BLD AUTO: 0.5 % — SIGNIFICANT CHANGE UP (ref 0–1.5)
LYMPHOCYTES # BLD AUTO: 0.49 K/UL — LOW (ref 1–3.3)
LYMPHOCYTES # BLD AUTO: 12.5 % — LOW (ref 13–44)
MAGNESIUM SERPL-MCNC: 2.1 MG/DL — SIGNIFICANT CHANGE UP (ref 1.6–2.6)
MCHC RBC-ENTMCNC: 28.2 PG — SIGNIFICANT CHANGE UP (ref 27–34)
MCHC RBC-ENTMCNC: 31.7 GM/DL — LOW (ref 32–36)
MCV RBC AUTO: 88.8 FL — SIGNIFICANT CHANGE UP (ref 80–100)
MONOCYTES # BLD AUTO: 0.34 K/UL — SIGNIFICANT CHANGE UP (ref 0–0.9)
MONOCYTES NFR BLD AUTO: 8.7 % — SIGNIFICANT CHANGE UP (ref 2–14)
NEUTROPHILS # BLD AUTO: 3.07 K/UL — SIGNIFICANT CHANGE UP (ref 1.8–7.4)
NEUTROPHILS NFR BLD AUTO: 78.3 % — HIGH (ref 43–77)
NRBC # BLD: 0 /100 WBCS — SIGNIFICANT CHANGE UP
NRBC # FLD: 0 K/UL — SIGNIFICANT CHANGE UP
PHOSPHATE SERPL-MCNC: 3.1 MG/DL — SIGNIFICANT CHANGE UP (ref 2.5–4.5)
PLATELET # BLD AUTO: 283 K/UL — SIGNIFICANT CHANGE UP (ref 150–400)
POTASSIUM SERPL-MCNC: 5.1 MMOL/L — SIGNIFICANT CHANGE UP (ref 3.5–5.3)
POTASSIUM SERPL-SCNC: 5.1 MMOL/L — SIGNIFICANT CHANGE UP (ref 3.5–5.3)
PROT SERPL-MCNC: 5.8 G/DL — LOW (ref 6–8.3)
RBC # BLD: 4.47 M/UL — SIGNIFICANT CHANGE UP (ref 3.8–5.2)
RBC # FLD: 13.3 % — SIGNIFICANT CHANGE UP (ref 10.3–14.5)
SODIUM SERPL-SCNC: 138 MMOL/L — SIGNIFICANT CHANGE UP (ref 135–145)
WBC # BLD: 3.92 K/UL — SIGNIFICANT CHANGE UP (ref 3.8–10.5)
WBC # FLD AUTO: 3.92 K/UL — SIGNIFICANT CHANGE UP (ref 3.8–10.5)

## 2021-01-12 PROCEDURE — 99233 SBSQ HOSP IP/OBS HIGH 50: CPT

## 2021-01-12 RX ADMIN — Medication 4: at 11:57

## 2021-01-12 RX ADMIN — Medication 1: at 09:11

## 2021-01-12 RX ADMIN — Medication 6 MILLIGRAM(S): at 04:18

## 2021-01-12 RX ADMIN — SODIUM CHLORIDE 50 MILLILITER(S): 9 INJECTION, SOLUTION INTRAVENOUS at 04:18

## 2021-01-12 RX ADMIN — ENOXAPARIN SODIUM 40 MILLIGRAM(S): 100 INJECTION SUBCUTANEOUS at 11:57

## 2021-01-12 NOTE — CHART NOTE - NSCHARTNOTEFT_GEN_A_CORE
ELECTROPHYSIOLOGY      Patient seen by EP for atrial fibrillation.   Started on low dose metoprolol 12.5 mg bid.   Now in sinus rhythm with heart rate trends 40-60's. Blood pressure stable.  Patient not on anticoagulation as per family's request.   Plan:  Continue metoprolol 12.5mg bid.

## 2021-01-12 NOTE — PROGRESS NOTE ADULT - PROBLEM SELECTOR PROBLEM 7
Abnormal thyroid stimulating hormone (TSH) level

## 2021-01-12 NOTE — PROVIDER CONTACT NOTE (OTHER) - SITUATION
As per tele tech, new onset First degree block noted during telemetry monitoring
Pt reports BM was hard to pass today
As per parameters AM metoprolol was held. Patient was bradycardic throughout PM shift. Patients HR went to as low as 37, Provider aware.
V tach on tele monitor, HR ranging from 102-140 , pt denies chest pain
pt /64 HR 49, metoprolol due

## 2021-01-12 NOTE — PROVIDER CONTACT NOTE (OTHER) - ACTION/TREATMENT ORDERED:
provider aware, will rx miralax
NP made aware, EKG done, Metoprolol for 6 pm to be held due to HR 47
Tele monitoring ongoing, pt denies chest pain
provider made aware, instructed to hold metoprolol and repeat BP in 1 hr
None at this time

## 2021-01-12 NOTE — PROVIDER CONTACT NOTE (OTHER) - RECOMMENDATIONS
EKG
notify provide give miralax
DANY Calvin at the bedside, EKG ordered as well as ECHO
None at this time, AM metoprolol was held will reattempt to give when patient is awake
give tessalon perles, hold metoprolol and recheck

## 2021-01-12 NOTE — PROGRESS NOTE ADULT - PROBLEM SELECTOR PLAN 7
Low TSH, normal free T4

## 2021-01-12 NOTE — PROGRESS NOTE ADULT - PROBLEM SELECTOR PLAN 2
d/c decadron and remdesivir tomorrow if patient is able to maintain her O2 sat above 90 at rest and with exertion completed remdesivir on 01/10/2021, continu dexamethasone as her D dimer is very high.

## 2021-01-13 LAB
GLUCOSE BLDC GLUCOMTR-MCNC: 137 MG/DL — HIGH (ref 70–99)
GLUCOSE BLDC GLUCOMTR-MCNC: 168 MG/DL — HIGH (ref 70–99)
GLUCOSE BLDC GLUCOMTR-MCNC: 178 MG/DL — HIGH (ref 70–99)
GLUCOSE BLDC GLUCOMTR-MCNC: 231 MG/DL — HIGH (ref 70–99)

## 2021-01-13 PROCEDURE — 99233 SBSQ HOSP IP/OBS HIGH 50: CPT

## 2021-01-13 RX ADMIN — Medication 1: at 08:35

## 2021-01-13 RX ADMIN — Medication 12.5 MILLIGRAM(S): at 17:17

## 2021-01-13 RX ADMIN — Medication 1: at 11:47

## 2021-01-13 RX ADMIN — ENOXAPARIN SODIUM 40 MILLIGRAM(S): 100 INJECTION SUBCUTANEOUS at 11:47

## 2021-01-13 RX ADMIN — Medication 100 MILLIGRAM(S): at 17:17

## 2021-01-13 RX ADMIN — Medication 2: at 17:26

## 2021-01-13 RX ADMIN — Medication 6 MILLIGRAM(S): at 05:07

## 2021-01-13 NOTE — CHART NOTE - NSCHARTNOTEFT_GEN_A_CORE
patient shows no acute distress. Sleeping. telemetry reviewed and shows sinus rhythm with HR 70s when awake and 40s-50s when sleeping

## 2021-01-14 LAB
ALBUMIN SERPL ELPH-MCNC: 2.8 G/DL — LOW (ref 3.3–5)
ALP SERPL-CCNC: 59 U/L — SIGNIFICANT CHANGE UP (ref 40–120)
ALT FLD-CCNC: 16 U/L — SIGNIFICANT CHANGE UP (ref 4–33)
ANION GAP SERPL CALC-SCNC: 7 MMOL/L — SIGNIFICANT CHANGE UP (ref 7–14)
AST SERPL-CCNC: 17 U/L — SIGNIFICANT CHANGE UP (ref 4–32)
BASOPHILS # BLD AUTO: 0 K/UL — SIGNIFICANT CHANGE UP (ref 0–0.2)
BASOPHILS NFR BLD AUTO: 0 % — SIGNIFICANT CHANGE UP (ref 0–2)
BILIRUB SERPL-MCNC: 0.6 MG/DL — SIGNIFICANT CHANGE UP (ref 0.2–1.2)
BUN SERPL-MCNC: 33 MG/DL — HIGH (ref 7–23)
CALCIUM SERPL-MCNC: 9.8 MG/DL — SIGNIFICANT CHANGE UP (ref 8.4–10.5)
CHLORIDE SERPL-SCNC: 102 MMOL/L — SIGNIFICANT CHANGE UP (ref 98–107)
CO2 SERPL-SCNC: 31 MMOL/L — SIGNIFICANT CHANGE UP (ref 22–31)
CREAT SERPL-MCNC: 0.8 MG/DL — SIGNIFICANT CHANGE UP (ref 0.5–1.3)
CRP SERPL-MCNC: 5 MG/L — HIGH
D DIMER BLD IA.RAPID-MCNC: 3869 NG/ML DDU — HIGH
EOSINOPHIL # BLD AUTO: 0.01 K/UL — SIGNIFICANT CHANGE UP (ref 0–0.5)
EOSINOPHIL NFR BLD AUTO: 0.1 % — SIGNIFICANT CHANGE UP (ref 0–6)
FERRITIN SERPL-MCNC: 338 NG/ML — HIGH (ref 15–150)
GLUCOSE BLDC GLUCOMTR-MCNC: 132 MG/DL — HIGH (ref 70–99)
GLUCOSE BLDC GLUCOMTR-MCNC: 155 MG/DL — HIGH (ref 70–99)
GLUCOSE BLDC GLUCOMTR-MCNC: 197 MG/DL — HIGH (ref 70–99)
GLUCOSE BLDC GLUCOMTR-MCNC: 260 MG/DL — HIGH (ref 70–99)
GLUCOSE SERPL-MCNC: 105 MG/DL — HIGH (ref 70–99)
HCT VFR BLD CALC: 41.1 % — SIGNIFICANT CHANGE UP (ref 34.5–45)
HGB BLD-MCNC: 12.9 G/DL — SIGNIFICANT CHANGE UP (ref 11.5–15.5)
IANC: 7.15 K/UL — SIGNIFICANT CHANGE UP (ref 1.5–8.5)
IMM GRANULOCYTES NFR BLD AUTO: 0.6 % — SIGNIFICANT CHANGE UP (ref 0–1.5)
LDH SERPL L TO P-CCNC: 190 U/L — SIGNIFICANT CHANGE UP (ref 135–225)
LYMPHOCYTES # BLD AUTO: 0.66 K/UL — LOW (ref 1–3.3)
LYMPHOCYTES # BLD AUTO: 7.7 % — LOW (ref 13–44)
MAGNESIUM SERPL-MCNC: 2.3 MG/DL — SIGNIFICANT CHANGE UP (ref 1.6–2.6)
MCHC RBC-ENTMCNC: 27.9 PG — SIGNIFICANT CHANGE UP (ref 27–34)
MCHC RBC-ENTMCNC: 31.4 GM/DL — LOW (ref 32–36)
MCV RBC AUTO: 88.8 FL — SIGNIFICANT CHANGE UP (ref 80–100)
MONOCYTES # BLD AUTO: 0.74 K/UL — SIGNIFICANT CHANGE UP (ref 0–0.9)
MONOCYTES NFR BLD AUTO: 8.6 % — SIGNIFICANT CHANGE UP (ref 2–14)
NEUTROPHILS # BLD AUTO: 7.15 K/UL — SIGNIFICANT CHANGE UP (ref 1.8–7.4)
NEUTROPHILS NFR BLD AUTO: 83 % — HIGH (ref 43–77)
NRBC # BLD: 0 /100 WBCS — SIGNIFICANT CHANGE UP
NRBC # FLD: 0 K/UL — SIGNIFICANT CHANGE UP
PHOSPHATE SERPL-MCNC: 2.9 MG/DL — SIGNIFICANT CHANGE UP (ref 2.5–4.5)
PLATELET # BLD AUTO: 313 K/UL — SIGNIFICANT CHANGE UP (ref 150–400)
POTASSIUM SERPL-MCNC: 4.6 MMOL/L — SIGNIFICANT CHANGE UP (ref 3.5–5.3)
POTASSIUM SERPL-SCNC: 4.6 MMOL/L — SIGNIFICANT CHANGE UP (ref 3.5–5.3)
PROCALCITONIN SERPL-MCNC: 0.05 NG/ML — SIGNIFICANT CHANGE UP (ref 0.02–0.1)
PROT SERPL-MCNC: 5.9 G/DL — LOW (ref 6–8.3)
RBC # BLD: 4.63 M/UL — SIGNIFICANT CHANGE UP (ref 3.8–5.2)
RBC # FLD: 13.6 % — SIGNIFICANT CHANGE UP (ref 10.3–14.5)
SARS-COV-2 RNA SPEC QL NAA+PROBE: DETECTED
SODIUM SERPL-SCNC: 140 MMOL/L — SIGNIFICANT CHANGE UP (ref 135–145)
WBC # BLD: 8.61 K/UL — SIGNIFICANT CHANGE UP (ref 3.8–10.5)
WBC # FLD AUTO: 8.61 K/UL — SIGNIFICANT CHANGE UP (ref 3.8–10.5)

## 2021-01-14 PROCEDURE — 99232 SBSQ HOSP IP/OBS MODERATE 35: CPT

## 2021-01-14 RX ADMIN — ENOXAPARIN SODIUM 40 MILLIGRAM(S): 100 INJECTION SUBCUTANEOUS at 12:03

## 2021-01-14 RX ADMIN — Medication 3: at 17:14

## 2021-01-14 RX ADMIN — Medication 1: at 12:05

## 2021-01-14 RX ADMIN — Medication 12.5 MILLIGRAM(S): at 21:01

## 2021-01-14 RX ADMIN — SENNA PLUS 2 TABLET(S): 8.6 TABLET ORAL at 21:01

## 2021-01-14 NOTE — PROGRESS NOTE ADULT - PROBLEM SELECTOR PLAN 3
Rate controlled. Some bradycardia on metoprolol but only QHS  Continue metoprolol per EP   TTE with normal cardiac fx, no sig valvular dz  family ok with AC on eliquis - CHADS = 4   BMP  EP following  Mg level  No AC per patient's family wishes (nephew to be exact)   Nl left ventricular function Rate controlled. Some bradycardia on metoprolol but only QHS  Continue metoprolol per EP   TTE with normal cardiac fx, no sig valvular dz  EP following  CHADS score =4 - No AC per patient's family wishes (nephew) now in sinus   Some bradycardia on metoprolol but only QHS  Continue metoprolol per EP   likely d/t COVID, hypoxemia   TTE with normal cardiac fx, no sig valvular dz  EP following  CHADS score =4 - No AC per patient's family wishes (nephew)

## 2021-01-14 NOTE — PROGRESS NOTE ADULT - ATTENDING COMMENTS
Dispo pending repeat COVID gracy (10 days) for dispo to rehab Dispo pending repeat COVID (10 days) for dispo to rehab Dispo pending repeat COVID (10 days) for dispo to rehab - sent today

## 2021-01-14 NOTE — PROGRESS NOTE ADULT - PROBLEM SELECTOR PLAN 2
will continue decadron to complete course, can switch to po on discharge if rehab facility unable to accommodate IV  completed Remdesivir on 01/10/2021

## 2021-01-14 NOTE — PROGRESS NOTE ADULT - PROBLEM SELECTOR PLAN 4
A!C controlled on no home meds  mariah LOZANO  FS controlled A1C controlled on no home meds  cw NISS  FS controlled

## 2021-01-15 VITALS
SYSTOLIC BLOOD PRESSURE: 151 MMHG | OXYGEN SATURATION: 96 % | DIASTOLIC BLOOD PRESSURE: 61 MMHG | HEART RATE: 62 BPM | TEMPERATURE: 98 F | RESPIRATION RATE: 18 BRPM

## 2021-01-15 LAB
ANION GAP SERPL CALC-SCNC: 12 MMOL/L — SIGNIFICANT CHANGE UP (ref 7–14)
BASOPHILS # BLD AUTO: 0.01 K/UL — SIGNIFICANT CHANGE UP (ref 0–0.2)
BASOPHILS NFR BLD AUTO: 0.1 % — SIGNIFICANT CHANGE UP (ref 0–2)
BUN SERPL-MCNC: 38 MG/DL — HIGH (ref 7–23)
CALCIUM SERPL-MCNC: 10 MG/DL — SIGNIFICANT CHANGE UP (ref 8.4–10.5)
CHLORIDE SERPL-SCNC: 101 MMOL/L — SIGNIFICANT CHANGE UP (ref 98–107)
CO2 SERPL-SCNC: 27 MMOL/L — SIGNIFICANT CHANGE UP (ref 22–31)
CREAT SERPL-MCNC: 0.94 MG/DL — SIGNIFICANT CHANGE UP (ref 0.5–1.3)
EOSINOPHIL # BLD AUTO: 0 K/UL — SIGNIFICANT CHANGE UP (ref 0–0.5)
EOSINOPHIL NFR BLD AUTO: 0 % — SIGNIFICANT CHANGE UP (ref 0–6)
GLUCOSE BLDC GLUCOMTR-MCNC: 122 MG/DL — HIGH (ref 70–99)
GLUCOSE BLDC GLUCOMTR-MCNC: 132 MG/DL — HIGH (ref 70–99)
GLUCOSE BLDC GLUCOMTR-MCNC: 135 MG/DL — HIGH (ref 70–99)
GLUCOSE SERPL-MCNC: 148 MG/DL — HIGH (ref 70–99)
HCT VFR BLD CALC: 44.4 % — SIGNIFICANT CHANGE UP (ref 34.5–45)
HGB BLD-MCNC: 13.8 G/DL — SIGNIFICANT CHANGE UP (ref 11.5–15.5)
IANC: 7.29 K/UL — SIGNIFICANT CHANGE UP (ref 1.5–8.5)
IMM GRANULOCYTES NFR BLD AUTO: 0.6 % — SIGNIFICANT CHANGE UP (ref 0–1.5)
LYMPHOCYTES # BLD AUTO: 1.49 K/UL — SIGNIFICANT CHANGE UP (ref 1–3.3)
LYMPHOCYTES # BLD AUTO: 15.2 % — SIGNIFICANT CHANGE UP (ref 13–44)
MAGNESIUM SERPL-MCNC: 2.4 MG/DL — SIGNIFICANT CHANGE UP (ref 1.6–2.6)
MCHC RBC-ENTMCNC: 28.1 PG — SIGNIFICANT CHANGE UP (ref 27–34)
MCHC RBC-ENTMCNC: 31.1 GM/DL — LOW (ref 32–36)
MCV RBC AUTO: 90.4 FL — SIGNIFICANT CHANGE UP (ref 80–100)
MONOCYTES # BLD AUTO: 0.97 K/UL — HIGH (ref 0–0.9)
MONOCYTES NFR BLD AUTO: 9.9 % — SIGNIFICANT CHANGE UP (ref 2–14)
NEUTROPHILS # BLD AUTO: 7.29 K/UL — SIGNIFICANT CHANGE UP (ref 1.8–7.4)
NEUTROPHILS NFR BLD AUTO: 74.2 % — SIGNIFICANT CHANGE UP (ref 43–77)
NRBC # BLD: 0 /100 WBCS — SIGNIFICANT CHANGE UP
NRBC # FLD: 0 K/UL — SIGNIFICANT CHANGE UP
PLATELET # BLD AUTO: 364 K/UL — SIGNIFICANT CHANGE UP (ref 150–400)
POTASSIUM SERPL-MCNC: 4.6 MMOL/L — SIGNIFICANT CHANGE UP (ref 3.5–5.3)
POTASSIUM SERPL-SCNC: 4.6 MMOL/L — SIGNIFICANT CHANGE UP (ref 3.5–5.3)
RBC # BLD: 4.91 M/UL — SIGNIFICANT CHANGE UP (ref 3.8–5.2)
RBC # FLD: 13.9 % — SIGNIFICANT CHANGE UP (ref 10.3–14.5)
SODIUM SERPL-SCNC: 140 MMOL/L — SIGNIFICANT CHANGE UP (ref 135–145)
WBC # BLD: 9.82 K/UL — SIGNIFICANT CHANGE UP (ref 3.8–10.5)
WBC # FLD AUTO: 9.82 K/UL — SIGNIFICANT CHANGE UP (ref 3.8–10.5)

## 2021-01-15 PROCEDURE — 99233 SBSQ HOSP IP/OBS HIGH 50: CPT

## 2021-01-15 RX ORDER — ENOXAPARIN SODIUM 100 MG/ML
40 INJECTION SUBCUTANEOUS
Qty: 0 | Refills: 0 | DISCHARGE
Start: 2021-01-15

## 2021-01-15 RX ORDER — ACETAMINOPHEN 500 MG
2 TABLET ORAL
Qty: 0 | Refills: 0 | DISCHARGE
Start: 2021-01-15

## 2021-01-15 RX ORDER — SENNA PLUS 8.6 MG/1
2 TABLET ORAL
Qty: 0 | Refills: 0 | DISCHARGE
Start: 2021-01-15

## 2021-01-15 RX ORDER — ALBUTEROL 90 UG/1
2 AEROSOL, METERED ORAL
Qty: 0 | Refills: 0 | DISCHARGE
Start: 2021-01-15

## 2021-01-15 RX ORDER — METOPROLOL TARTRATE 50 MG
12.5 TABLET ORAL
Qty: 0 | Refills: 0 | DISCHARGE
Start: 2021-01-15

## 2021-01-15 RX ORDER — POLYETHYLENE GLYCOL 3350 17 G/17G
17 POWDER, FOR SOLUTION ORAL
Qty: 0 | Refills: 0 | DISCHARGE
Start: 2021-01-15

## 2021-01-15 RX ORDER — INSULIN LISPRO 100/ML
0 VIAL (ML) SUBCUTANEOUS
Qty: 0 | Refills: 0 | DISCHARGE
Start: 2021-01-15

## 2021-01-15 RX ADMIN — Medication 12.5 MILLIGRAM(S): at 06:10

## 2021-01-15 RX ADMIN — Medication 6 MILLIGRAM(S): at 06:10

## 2021-01-15 RX ADMIN — Medication 12.5 MILLIGRAM(S): at 17:49

## 2021-01-15 RX ADMIN — SENNA PLUS 2 TABLET(S): 8.6 TABLET ORAL at 21:37

## 2021-01-15 RX ADMIN — ENOXAPARIN SODIUM 40 MILLIGRAM(S): 100 INJECTION SUBCUTANEOUS at 12:08

## 2021-01-15 NOTE — PROVIDER CONTACT NOTE (OTHER) - ASSESSMENT
SW contacted [facility] ([phone]) to arrange ambulance for pt, trip id: , :   Bi SW contacted [facility] ([phone]) to arrange ambulance for pt, trip id: 761A , : 11:30pm.  [employer] pending auth. Non-emergent form placed in pts paperchart on floor. CONG Alaniz informed of above. SW contacted [facility] ([phone]) to arrange ambulance for pt, trip id: [id number] , : 11:30pm.  [employer] pending auth. Non-emergent form placed in pts paperchart on floor. RN Katty informed of above. SANDRINE spoke to Nursing Supervisor, Clara at [facility] ([phone]) to confirm pts bed is still available and Clara confirmed. No further SW needs at this time.

## 2021-01-15 NOTE — CHART NOTE - NSCHARTNOTEFT_GEN_A_CORE
Telemetry Note:   Currently NSR 60's on telemetry; rate controlled atrial fibrillation noted overnight

## 2021-01-15 NOTE — PROVIDER CONTACT NOTE (OTHER) - BACKGROUND
Covid +
SW received a call from 8T CONG Traylor. Pt was supposed to d/c to [facility] but transportation never showed up.
Covid +, HTN
Covid +
Covid +
HTN, DM

## 2021-01-15 NOTE — PROGRESS NOTE ADULT - PROVIDER SPECIALTY LIST ADULT
Electrophysiology
Hospitalist

## 2021-01-15 NOTE — PROGRESS NOTE ADULT - PROBLEM SELECTOR PLAN 3
now in sinus   Some bradycardia on metoprolol but only QHS  Continue metoprolol per EP   likely d/t COVID, hypoxemia   TTE with normal cardiac fx, no sig valvular dz  EP following  CHADS score =4 - No AC per patient's family wishes (nephew)

## 2021-01-15 NOTE — DISCHARGE NOTE NURSING/CASE MANAGEMENT/SOCIAL WORK - PATIENT PORTAL LINK FT
You can access the FollowMyHealth Patient Portal offered by [facility] by registering at the following website: [email or web address]. By joining [facility]’s FollowMyHealth portal, you will also be able to view your health information using other applications (apps) compatible with our system.

## 2021-01-15 NOTE — PROGRESS NOTE ADULT - PROBLEM SELECTOR PROBLEM 2
Pneumonia due to COVID-19 virus

## 2021-01-15 NOTE — PROGRESS NOTE ADULT - PROBLEM SELECTOR PROBLEM 3
TIMA (acute kidney injury)
Atrial fibrillation
Atrial fibrillation
TIMA (acute kidney injury)
TIMA (acute kidney injury)
Atrial fibrillation
TIMA (acute kidney injury)

## 2021-01-15 NOTE — PROGRESS NOTE ADULT - PROBLEM SELECTOR PROBLEM 5
Essential hypertension
T2DM (type 2 diabetes mellitus)
Essential hypertension
T2DM (type 2 diabetes mellitus)
Essential hypertension
T2DM (type 2 diabetes mellitus)

## 2021-01-15 NOTE — PROGRESS NOTE ADULT - PROBLEM SELECTOR PROBLEM 1
Acute respiratory failure with hypoxia

## 2021-01-15 NOTE — CHART NOTE - NSCHARTNOTESELECT_GEN_ALL_CORE
Event Note

## 2021-01-15 NOTE — PROGRESS NOTE ADULT - NUTRITIONAL ASSESSMENT
no malnutrition

## 2021-01-15 NOTE — DISCHARGE NOTE NURSING/CASE MANAGEMENT/SOCIAL WORK - NSDCFUADDAPPT_GEN_ALL_CORE_FT
If you are in need of a general medicine physician and post-discharge medical follow-up for further care/recommendations you may contact the [facility] for an appointment [phone]

## 2021-01-15 NOTE — PROVIDER CONTACT NOTE (OTHER) - REASON
AM Metoprolol Held
SW
pt c/o hard BM
V tach on tele monitor
elevated BP and low HR
First degree block on tele

## 2021-01-15 NOTE — PROGRESS NOTE ADULT - PROBLEM SELECTOR PLAN 6
Low TSH, normal free T4
Low TSH, normal free T4  shouldn't be culprit for AFIB  repeat TFTs in 4 weeks
Low TSH, normal free T4  shouldn't be culprit for AFIB  repeat TFTs in 4 weeks
Chronic  Home antihypertensives

## 2021-01-15 NOTE — PROGRESS NOTE ADULT - PROBLEM SELECTOR PLAN 1
Improving. Due to COVID PNA. Ddimer significantly elevated   Supplemental oxygen: non rebreather  Systemic steroids  Wean off oxygen as tolerated  CTA chest to rule out PE was cancelled on 1/8 due to blown IV site, look to repeat once IV site re-established
Much resolved. Due to COVID PNA. Ddimer significantly elevated   Supplemental oxygen: NC  Systemic steroids  Wean off oxygen as tolerated  CTA chest to rule out PE was cancelled on 1/8 due to blown IV site, look to repeat once IV site re-established
resolved   Due to COVID PNA.      Systemic steroids  Wean off oxygen as tolerated  d dimer trending down
resolved   Due to COVID PNA.      Systemic steroids  Wean off oxygen as tolerated  d dimer trending down
Improving. Due to COVID PNA. Ddimer significantly elevated   Supplemental oxygen: NC  Systemic steroids  Wean off oxygen as tolerated  CTA chest to rule out PE was cancelled on 1/8 due to blown IV site, look to repeat once IV site re-established
resolved   Due to COVID PNA.      Systemic steroids  Wean off oxygen as tolerated  d dimer trending down
resolved   Due to COVID PNA.      Systemic steroids  Wean off oxygen as tolerated  d dimer trending down
Much resolved. Due to COVID PNA. Ddimer significantly elevated   Supplemental oxygen: NC  Systemic steroids  Wean off oxygen as tolerated  d dimer trending down
Severe but stable. Due to COVID PNA  Supplemental oxygen: non rebreather  Systemic steroids  Wean off oxygen as tolerated

## 2021-01-15 NOTE — PROGRESS NOTE ADULT - PROBLEM SELECTOR PROBLEM 6
Abnormal thyroid stimulating hormone (TSH) level
Essential hypertension
Abnormal thyroid stimulating hormone (TSH) level
Abnormal thyroid stimulating hormone (TSH) level
Essential hypertension

## 2021-01-15 NOTE — PROGRESS NOTE ADULT - PROBLEM SELECTOR PROBLEM 4
T2DM (type 2 diabetes mellitus)
Atrial fibrillation
T2DM (type 2 diabetes mellitus)
T2DM (type 2 diabetes mellitus)
Atrial fibrillation

## 2021-01-15 NOTE — PROGRESS NOTE ADULT - SUBJECTIVE AND OBJECTIVE BOX
Patient is comfortable in the bad . No acute complains . She is stable to be discharge to rehab.     VITAL SIGNS:  T(F): 97.3 (01-15-21 @ 06:09)  HR: 60 (01-15-21 @ 17:49)  BP: 138/61 (01-15-21 @ 17:49)  RR: 18 (01-15-21 @ 12:07)  SpO2: 100% (01-15-21 @ 12:07)  Wt(kg): --    PHYSICAL EXAM:    Constitutional: WDWN, NAD  HEENT: PERRL, EOMI, sclera non-icteric, neck supple, trachea midline, no masses, no JVD, MMM, good dentition  Respiratory: mils decreased bs b/l , good air entry b/l, no wheezing, no rhonchi, no rales, without accessory muscle use and no intercostal retractions  Cardiovascular: RRR, normal S1S2, no M/R/G  Gastrointestinal: soft, NTND, no masses palpable, BS normal  Extremities: Warm, well perfused, pulses equal bilateral upper and lower extremities, no edema, no clubbing  Neurological: AAOx3, CN Grossly intact  Skin: Normal temperature, warm, dry    MEDICATIONS  (STANDING):  dextrose 50% Injectable 25 Gram(s) IV Push once  enoxaparin Injectable 40 milliGRAM(s) SubCutaneous daily  insulin lispro (ADMELOG) corrective regimen sliding scale   SubCutaneous three times a day before meals  insulin lispro (ADMELOG) corrective regimen sliding scale   SubCutaneous at bedtime  metoprolol tartrate 12.5 milliGRAM(s) Oral two times a day  senna 2 Tablet(s) Oral at bedtime    MEDICATIONS  (PRN):  acetaminophen   Tablet .. 650 milliGRAM(s) Oral every 6 hours PRN Temp greater or equal to 38C (100.4F), Mild Pain (1 - 3), Moderate Pain (4 - 6)  ALBUTerol    90 MICROgram(s) HFA Inhaler 2 Puff(s) Inhalation every 4 hours PRN Shortness of Breath and/or Wheezing  artificial tears (preservative free) Ophthalmic Solution 1 Drop(s) Both EYES every 4 hours PRN Dry Eyes  benzonatate 100 milliGRAM(s) Oral three times a day PRN Cough  polyethylene glycol 3350 17 Gram(s) Oral daily PRN Constipation      Allergies    No Known Allergies    Intolerances        LABS:                        13.8   9.82  )-----------( 364      ( 15 Liban 2021 07:52 )             44.4     01-15    140  |  101  |  38<H>  ----------------------------<  148<H>  4.6   |  27  |  0.94    Ca    10.0      15 Liban 2021 07:52  Phos  2.9     01-14  Mg     2.4     01-15    TPro  5.9<L>  /  Alb  2.8<L>  /  TBili  0.6  /  DBili  x   /  AST  17  /  ALT  16  /  AlkPhos  59  01-14          RADIOLOGY & ADDITIONAL TESTS:  Reviewed
Patient laying quietly in bed. She appears comfortable with nonrebreather.   Afebrile today.   Patient started on low dose beta blocker yesterday for PAF w/RVR (130's). No events overnight.   Telemetry reviewed.  Has remained in sinus rhythm but has heart rate trends in the 40-50's.  Patient asymptomatic.        Vital Signs Last 24 Hrs  T(C): 36.5 (07 Jan 2021 12:06), Max: 36.7 (06 Jan 2021 17:55)  T(F): 97.7 (07 Jan 2021 12:06), Max: 98 (06 Jan 2021 17:55)  HR: 51 (07 Jan 2021 12:06) (47 - 77)  BP: 111/46 (07 Jan 2021 12:06) (111/46 - 146/90)  BP(mean): --  RR: 20 (07 Jan 2021 12:06) (19 - 22)  SpO2: 100% (07 Jan 2021 12:06) (95% - 100%)      EKG  Telemetry:  Sinus bradycardia 40's - 50's.    MEDICATIONS  (STANDING):  dexAMETHasone  Injectable 6 milliGRAM(s) IV Push daily  dextrose 50% Injectable 25 Gram(s) IV Push once  enoxaparin Injectable 40 milliGRAM(s) SubCutaneous daily  insulin lispro (ADMELOG) corrective regimen sliding scale   SubCutaneous three times a day before meals  insulin lispro (ADMELOG) corrective regimen sliding scale   SubCutaneous at bedtime  metoprolol tartrate 12.5 milliGRAM(s) Oral daily  remdesivir  IVPB   IV Intermittent   remdesivir  IVPB 200 milliGRAM(s) IV Intermittent every 24 hours  sodium chloride 0.9%. 1000 milliLiter(s) (50 mL/Hr) IV Continuous <Continuous>    MEDICATIONS  (PRN):  acetaminophen   Tablet .. 650 milliGRAM(s) Oral every 6 hours PRN Temp greater or equal to 38C (100.4F), Mild Pain (1 - 3), Moderate Pain (4 - 6)  ALBUTerol    90 MICROgram(s) HFA Inhaler 2 Puff(s) Inhalation every 4 hours PRN Shortness of Breath and/or Wheezing  benzonatate 100 milliGRAM(s) Oral three times a day PRN Cough          Physical exam:   Gen- Patient laying in bed Quiet NAD  Resp- poor effort.  Decreased breath sounds  CV- S1 and S2 RRR. grade 1-2/6 systolic murmur  ABD- soft nontender + bowel sounds  EXT- no edema  Neuro- grossly nonfocal                            11.4   6.16  )-----------( 262      ( 07 Jan 2021 06:48 )             38.0     PT/INR - ( 06 Jan 2021 15:07 )   PT: 12.8 sec;   INR: 1.13 ratio         PTT - ( 05 Jan 2021 22:25 )  PTT:30.1 sec  01-07    x   |  x   |  x   ----------------------------<  x   x    |  x   |  0.78    Ca    9.3      07 Jan 2021 06:48  Phos  2.6     01-06  Mg     2.3     01-07    TPro  7.3  /  Alb  2.9<L>  /  TBili  0.3  /  DBili  <0.2  /  AST  40<H>  /  ALT  20  /  AlkPhos  68  01-07    CARDIAC MARKERS ( 06 Jan 2021 15:10 )  x     / x     / 90 U/L / x     / x                            
 Patient  is at room air need   awaiting repeat  covid test 10 days after the  first one to be d/c to the rehab .   Patient denies: fever, chills, dizziness, weakness, HA, Changes in vision, CP, palpitations, SOB, cough, N/V/D/C, dysuria, changes in bowel movements, LE edema. ROS otherwise negative.    VITAL SIGNS:  T(F): 97.6 (01-13-21 @ 13:57)  HR: 77 (01-13-21 @ 17:15)  BP: 125/58 (01-13-21 @ 17:15)  RR: 17 (01-13-21 @ 17:15)  SpO2: 95% (01-13-21 @ 17:15)  Wt(kg): --    PHYSICAL EXAM:    Constitutional: WDWN, NAD  HEENT: PERRL, EOMI, sclera non-icteric, neck supple, trachea midline, no masses, no JVD, MMM, good dentition  Respiratory:slightly decreased bs  b/l, no wheezing, no rhonchi, no rales, without accessory muscle use and no intercostal retractions  Cardiovascular: RRR, normal S1S2, no M/R/G  Gastrointestinal: soft, NTND, no masses palpable, BS normal  Extremities: Warm, well perfused, pulses equal bilateral upper and lower extremities, no edema, no clubbing  Neurological: AAOx3, CN Grossly intact  Skin: Normal temperature, warm, dry    MEDICATIONS  (STANDING):  dexAMETHasone  Injectable 6 milliGRAM(s) IV Push daily  dextrose 50% Injectable 25 Gram(s) IV Push once  enoxaparin Injectable 40 milliGRAM(s) SubCutaneous daily  insulin lispro (ADMELOG) corrective regimen sliding scale   SubCutaneous three times a day before meals  insulin lispro (ADMELOG) corrective regimen sliding scale   SubCutaneous at bedtime  metoprolol tartrate 12.5 milliGRAM(s) Oral two times a day  senna 2 Tablet(s) Oral at bedtime    MEDICATIONS  (PRN):  acetaminophen   Tablet .. 650 milliGRAM(s) Oral every 6 hours PRN Temp greater or equal to 38C (100.4F), Mild Pain (1 - 3), Moderate Pain (4 - 6)  ALBUTerol    90 MICROgram(s) HFA Inhaler 2 Puff(s) Inhalation every 4 hours PRN Shortness of Breath and/or Wheezing  artificial tears (preservative free) Ophthalmic Solution 1 Drop(s) Both EYES every 4 hours PRN Dry Eyes  benzonatate 100 milliGRAM(s) Oral three times a day PRN Cough  polyethylene glycol 3350 17 Gram(s) Oral daily PRN Constipation      Allergies    No Known Allergies    Intolerances        LABS:                        12.6   3.92  )-----------( 283      ( 12 Jan 2021 07:36 )             39.7     01-12    138  |  99  |  27<H>  ----------------------------<  185<H>  5.1   |  30  |  0.70    Ca    9.6      12 Jan 2021 07:36  Phos  3.1     01-12  Mg     2.1     01-12    TPro  5.8<L>  /  Alb  2.7<L>  /  TBili  0.7  /  DBili  <0.2  /  AST  23  /  ALT  20  /  AlkPhos  53  01-12          RADIOLOGY & ADDITIONAL TESTS:  Reviewed
Medicine Progress Note    Patient is a 89y old  Female who presents with a chief complaint of shortness of breath (07 Jan 2021 16:37)      SUBJECTIVE / OVERNIGHT EVENTS: she feels tired and has low appetite. She does not have any other complaints. No SOB or chest pain. Used pac[employer]  for [place].    ADDITIONAL REVIEW OF SYSTEMS:    MEDICATIONS  (STANDING):  dexAMETHasone  Injectable 6 milliGRAM(s) IV Push daily  dextrose 50% Injectable 25 Gram(s) IV Push once  enoxaparin Injectable 40 milliGRAM(s) SubCutaneous daily  insulin lispro (ADMELOG) corrective regimen sliding scale   SubCutaneous three times a day before meals  insulin lispro (ADMELOG) corrective regimen sliding scale   SubCutaneous at bedtime  metoprolol tartrate 12.5 milliGRAM(s) Oral two times a day  remdesivir  IVPB   IV Intermittent   remdesivir  IVPB 200 milliGRAM(s) IV Intermittent every 24 hours  sodium chloride 0.9%. 1000 milliLiter(s) (50 mL/Hr) IV Continuous <Continuous>    MEDICATIONS  (PRN):  acetaminophen   Tablet .. 650 milliGRAM(s) Oral every 6 hours PRN Temp greater or equal to 38C (100.4F), Mild Pain (1 - 3), Moderate Pain (4 - 6)  ALBUTerol    90 MICROgram(s) HFA Inhaler 2 Puff(s) Inhalation every 4 hours PRN Shortness of Breath and/or Wheezing  benzonatate 100 milliGRAM(s) Oral three times a day PRN Cough    CAPILLARY BLOOD GLUCOSE      POCT Blood Glucose.: 99 mg/dL (07 Jan 2021 11:48)  POCT Blood Glucose.: 91 mg/dL (07 Jan 2021 08:36)  POCT Blood Glucose.: 100 mg/dL (06 Jan 2021 23:23)    I&O's Summary      PHYSICAL EXAM:  Vital Signs Last 24 Hrs  T(C): 36.5 (07 Jan 2021 12:06), Max: 36.7 (06 Jan 2021 17:55)  T(F): 97.7 (07 Jan 2021 12:06), Max: 98 (06 Jan 2021 17:55)  HR: 51 (07 Jan 2021 12:06) (47 - 77)  BP: 111/46 (07 Jan 2021 12:06) (111/46 - 146/90)  BP(mean): --  RR: 20 (07 Jan 2021 12:06) (19 - 22)  SpO2: 100% (07 Jan 2021 12:06) (95% - 100%)  CONSTITUTIONAL: NAD, well-developed, well-groomed  ENMT: dry oral mucosa, BL eyes with mucous discharge patient states that this is a chronic problem)  RESPIRATORY: Normal respiratory effort; lungs are clear to auscultation bilaterally  CARDIOVASCULAR: Regular rate, irregularly irregular rhythm, bradycardia  ABDOMEN: Nontender to palpation, normoactive bowel sounds, no rebound/guarding; No hepatosplenomegaly  PSYCH: A+O to person, place, and time; affect appropriate  NEUROLOGY: Alert  SKIN: No rashes; no palpable lesions    LABS:                        11.4   6.16  )-----------( 262      ( 07 Jan 2021 06:48 )             38.0     01-07    x   |  x   |  x   ----------------------------<  x   x    |  x   |  0.78    Ca    9.3      07 Jan 2021 06:48  Phos  2.6     01-06  Mg     2.3     01-07    TPro  7.3  /  Alb  2.9<L>  /  TBili  0.3  /  DBili  <0.2  /  AST  40<H>  /  ALT  20  /  AlkPhos  68  01-07    PT/INR - ( 06 Jan 2021 15:07 )   PT: 12.8 sec;   INR: 1.13 ratio         PTT - ( 05 Jan 2021 22:25 )  PTT:30.1 sec  CARDIAC MARKERS ( 06 Jan 2021 15:10 )  x     / x     / 90 U/L / x     / x              Culture - Blood (collected 06 Jan 2021 06:29)  Source: .Blood Blood-Peripheral  Preliminary Report (07 Jan 2021 07:01):    No growth to date.    Culture - Blood (collected 06 Jan 2021 06:28)  Source: .Blood Blood-Venous  Preliminary Report (07 Jan 2021 07:01):    No growth to date.      SARS-CoV-2: Detected (05 Jan 2021 23:56)      RADIOLOGY & ADDITIONAL TESTS:  Imaging from Last 24 Hours:    Electrocardiogram/QTc Interval:    COORDINATION OF CARE:  Care Discussed with Consultants/Other Providers:  
Medicine Progress Note    Patient is a 89y old  Female who presents with a chief complaint of shortness of breath (07 Jan 2021 17:28)      SUBJECTIVE / OVERNIGHT EVENTS: no new complaints. just feels tired    ADDITIONAL REVIEW OF SYSTEMS:    MEDICATIONS  (STANDING):  dexAMETHasone  Injectable 6 milliGRAM(s) IV Push daily  dextrose 50% Injectable 25 Gram(s) IV Push once  enoxaparin Injectable 40 milliGRAM(s) SubCutaneous daily  insulin lispro (ADMELOG) corrective regimen sliding scale   SubCutaneous three times a day before meals  insulin lispro (ADMELOG) corrective regimen sliding scale   SubCutaneous at bedtime  metoprolol tartrate 12.5 milliGRAM(s) Oral two times a day  remdesivir  IVPB   IV Intermittent   remdesivir  IVPB 100 milliGRAM(s) IV Intermittent every 24 hours    MEDICATIONS  (PRN):  acetaminophen   Tablet .. 650 milliGRAM(s) Oral every 6 hours PRN Temp greater or equal to 38C (100.4F), Mild Pain (1 - 3), Moderate Pain (4 - 6)  ALBUTerol    90 MICROgram(s) HFA Inhaler 2 Puff(s) Inhalation every 4 hours PRN Shortness of Breath and/or Wheezing  artificial tears (preservative free) Ophthalmic Solution 1 Drop(s) Both EYES every 4 hours PRN Dry Eyes  benzonatate 100 milliGRAM(s) Oral three times a day PRN Cough    CAPILLARY BLOOD GLUCOSE      POCT Blood Glucose.: 209 mg/dL (08 Jan 2021 11:54)  POCT Blood Glucose.: 136 mg/dL (08 Jan 2021 08:36)  POCT Blood Glucose.: 175 mg/dL (07 Jan 2021 21:14)  POCT Blood Glucose.: 110 mg/dL (07 Jan 2021 17:38)    I&O's Summary    08 Jan 2021 07:01  -  08 Jan 2021 17:15  --------------------------------------------------------  IN: 120 mL / OUT: 200 mL / NET: -80 mL        PHYSICAL EXAM:  Vital Signs Last 24 Hrs  T(C): 36.4 (08 Jan 2021 14:17), Max: 36.6 (07 Jan 2021 22:00)  T(F): 97.5 (08 Jan 2021 14:17), Max: 97.8 (07 Jan 2021 22:00)  HR: 51 (08 Jan 2021 14:17) (51 - 71)  BP: 133/59 (08 Jan 2021 14:17) (124/62 - 139/59)  BP(mean): --  RR: 18 (08 Jan 2021 14:17) (18 - 20)  SpO2: 100% (08 Jan 2021 14:17) (100% - 100%)  CONSTITUTIONAL: NAD, well-developed, well-groomed  ENMT: dry oral mucosa, BL eyes with mucous discharge patient states that this is a chronic problem)  RESPIRATORY: Normal respiratory effort; lungs are clear to auscultation bilaterally  CARDIOVASCULAR: Regular rate, irregularly irregular rhythm, bradycardia  ABDOMEN: Nontender to palpation, normoactive bowel sounds, no rebound/guarding; No hepatosplenomegaly  PSYCH: A+O to person, place, and time; affect appropriate  NEUROLOGY: Alert  SKIN: No rashes; no palpable lesions    LABS:                        11.6   4.45  )-----------( 310      ( 08 Jan 2021 07:25 )             39.2     01-08    142  |  107  |  32<H>  ----------------------------<  122<H>  4.9   |  25  |  0.85    Ca    9.7      08 Jan 2021 07:25  Mg     2.3     01-07    TPro  6.7  /  Alb  2.8<L>  /  TBili  <0.2  /  DBili  x   /  AST  31  /  ALT  19  /  AlkPhos  61  01-08              Culture - Blood (collected 06 Jan 2021 06:29)  Source: .Blood Blood-Peripheral  Preliminary Report (07 Jan 2021 07:01):    No growth to date.    Culture - Blood (collected 06 Jan 2021 06:28)  Source: .Blood Blood-Venous  Preliminary Report (07 Jan 2021 07:01):    No growth to date.      SARS-CoV-2: Detected (05 Jan 2021 23:56)      RADIOLOGY & ADDITIONAL TESTS:  Imaging from Last 24 Hours:    Electrocardiogram/QTc Interval:    COORDINATION OF CARE:  Care Discussed with Consultants/Other Providers:  
Patient is a 89y old  Female who presents with a chief complaint of shortness of breath (13 Jan 2021 20:41)      SUBJECTIVE / OVERNIGHT EVENTS: remains stable on RA - no events     ROS:  No HA/DZ  No Vision changes   No CP, SOB  No N/V/D  No Edema  No Rash  NO weakness, numbness    MEDICATIONS  (STANDING):  dexAMETHasone  Injectable 6 milliGRAM(s) IV Push daily  dextrose 50% Injectable 25 Gram(s) IV Push once  enoxaparin Injectable 40 milliGRAM(s) SubCutaneous daily  insulin lispro (ADMELOG) corrective regimen sliding scale   SubCutaneous three times a day before meals  insulin lispro (ADMELOG) corrective regimen sliding scale   SubCutaneous at bedtime  metoprolol tartrate 12.5 milliGRAM(s) Oral two times a day  senna 2 Tablet(s) Oral at bedtime    MEDICATIONS  (PRN):  acetaminophen   Tablet .. 650 milliGRAM(s) Oral every 6 hours PRN Temp greater or equal to 38C (100.4F), Mild Pain (1 - 3), Moderate Pain (4 - 6)  ALBUTerol    90 MICROgram(s) HFA Inhaler 2 Puff(s) Inhalation every 4 hours PRN Shortness of Breath and/or Wheezing  artificial tears (preservative free) Ophthalmic Solution 1 Drop(s) Both EYES every 4 hours PRN Dry Eyes  benzonatate 100 milliGRAM(s) Oral three times a day PRN Cough  polyethylene glycol 3350 17 Gram(s) Oral daily PRN Constipation      T(C): 36.8 (01-14-21 @ 09:40)  HR: 55 (01-14-21 @ 09:40)  BP: 143/55 (01-14-21 @ 09:40)  RR: 17 (01-14-21 @ 09:40)  SpO2: 96% (01-14-21 @ 09:40)  CAPILLARY BLOOD GLUCOSE      POCT Blood Glucose.: 155 mg/dL (14 Jan 2021 11:36)  POCT Blood Glucose.: 132 mg/dL (14 Jan 2021 09:40)  POCT Blood Glucose.: 137 mg/dL (13 Jan 2021 21:27)  POCT Blood Glucose.: 231 mg/dL (13 Jan 2021 16:50)    I&O's Summary    13 Jan 2021 07:01  -  14 Jan 2021 07:00  --------------------------------------------------------  IN: 0 mL / OUT: 700 mL / NET: -700 mL        PHYSICAL EXAM:  GENERAL: NAD, well-developed, AOx3  HEAD:  Atraumatic, Normocephalic  EYES: EOMI, PERRL, conjunctiva and sclera clear  NECK: Supple, No JVD  CHEST/LUNG: Clear to auscultation bilaterally  HEART: Regular rate and rhythm; No murmurs, rubs, or gallops, No Edema  ABDOMEN: Soft, Nontender, Nondistended; Bowel sounds present  EXTREMITIES:  2+ Peripheral Pulses, No clubbing, cyanosis      LABS:                        12.9   8.61  )-----------( 313      ( 14 Jan 2021 08:00 )             41.1     01-14    140  |  102  |  33<H>  ----------------------------<  105<H>  4.6   |  31  |  0.80    Ca    9.8      14 Jan 2021 08:00  Phos  2.9     01-14  Mg     2.3     01-14    TPro  5.9<L>  /  Alb  2.8<L>  /  TBili  0.6  /  DBili  x   /  AST  17  /  ALT  16  /  AlkPhos  59  01-14                  RADIOLOGY & ADDITIONAL TESTS:    Imaging Personally Reviewed:    Consultant(s) Notes Reviewed:      Care Discussed with Consultants/Other Providers:  
Medicine Progress Note    Patient is a 89y old  Female who presents with a chief complaint of shortness of breath (09 Jan 2021 12:57)      SUBJECTIVE / OVERNIGHT EVENTS: she feels much better today. no SOB or chest pain    ADDITIONAL REVIEW OF SYSTEMS:    MEDICATIONS  (STANDING):  dexAMETHasone  Injectable 6 milliGRAM(s) IV Push daily  dextrose 50% Injectable 25 Gram(s) IV Push once  enoxaparin Injectable 40 milliGRAM(s) SubCutaneous daily  insulin lispro (ADMELOG) corrective regimen sliding scale   SubCutaneous three times a day before meals  insulin lispro (ADMELOG) corrective regimen sliding scale   SubCutaneous at bedtime  metoprolol tartrate 12.5 milliGRAM(s) Oral two times a day  senna 2 Tablet(s) Oral at bedtime    MEDICATIONS  (PRN):  acetaminophen   Tablet .. 650 milliGRAM(s) Oral every 6 hours PRN Temp greater or equal to 38C (100.4F), Mild Pain (1 - 3), Moderate Pain (4 - 6)  ALBUTerol    90 MICROgram(s) HFA Inhaler 2 Puff(s) Inhalation every 4 hours PRN Shortness of Breath and/or Wheezing  artificial tears (preservative free) Ophthalmic Solution 1 Drop(s) Both EYES every 4 hours PRN Dry Eyes  benzonatate 100 milliGRAM(s) Oral three times a day PRN Cough  polyethylene glycol 3350 17 Gram(s) Oral daily PRN Constipation    CAPILLARY BLOOD GLUCOSE      POCT Blood Glucose.: 114 mg/dL (10 Liban 2021 17:43)  POCT Blood Glucose.: 293 mg/dL (10 Liban 2021 12:23)  POCT Blood Glucose.: 132 mg/dL (10 Liban 2021 09:33)  POCT Blood Glucose.: 187 mg/dL (09 Jan 2021 21:46)    I&O's Summary    10 Liban 2021 07:01  -  10 Liban 2021 18:05  --------------------------------------------------------  IN: 0 mL / OUT: 700 mL / NET: -700 mL        PHYSICAL EXAM:  Vital Signs Last 24 Hrs  T(C): 37.1 (10 Liban 2021 17:56), Max: 37.1 (10 Liban 2021 17:56)  T(F): 98.7 (10 Liban 2021 17:56), Max: 98.7 (10 Liban 2021 17:56)  HR: 49 (10 Liban 2021 17:56) (49 - 100)  BP: 177/64 (10 Liban 2021 17:56) (136/47 - 177/64)  BP(mean): --  RR: 18 (10 Liban 2021 17:56) (18 - 18)  SpO2: 99% (10 Liban 2021 17:56) (99% - 100%)  CONSTITUTIONAL: NAD, well-developed, well-groomed  ENMT: dry oral mucosa, BL eyes with mucous discharge patient states that this is a chronic problem)  RESPIRATORY: Normal respiratory effort; lungs are clear to auscultation bilaterally nasal canula  CARDIOVASCULAR: Regular rate, irregularly irregular rhythm, bradycardia  ABDOMEN: Nontender to palpation, normoactive bowel sounds, no rebound/guarding; No hepatosplenomegaly  PSYCH: affect appropriate  NEUROLOGY: Alert  SKIN: No rashes; no palpable lesions     LABS:                    SARS-CoV-2: Detected (05 Jan 2021 23:56)      RADIOLOGY & ADDITIONAL TESTS:  Imaging from Last 24 Hours:    Electrocardiogram/QTc Interval:    COORDINATION OF CARE:  Care Discussed with Consultants/Other Providers:  
Medicine Progress Note    Patient is a 89y old  Female who presents with a chief complaint of shortness of breath (08 Jan 2021 17:15)      SUBJECTIVE / OVERNIGHT EVENTS: she feels better today. claims no problems with breathing    ADDITIONAL REVIEW OF SYSTEMS:    MEDICATIONS  (STANDING):  dexAMETHasone  Injectable 6 milliGRAM(s) IV Push daily  dextrose 50% Injectable 25 Gram(s) IV Push once  enoxaparin Injectable 40 milliGRAM(s) SubCutaneous daily  insulin lispro (ADMELOG) corrective regimen sliding scale   SubCutaneous three times a day before meals  insulin lispro (ADMELOG) corrective regimen sliding scale   SubCutaneous at bedtime  metoprolol tartrate 12.5 milliGRAM(s) Oral two times a day  remdesivir  IVPB   IV Intermittent   remdesivir  IVPB 100 milliGRAM(s) IV Intermittent every 24 hours    MEDICATIONS  (PRN):  acetaminophen   Tablet .. 650 milliGRAM(s) Oral every 6 hours PRN Temp greater or equal to 38C (100.4F), Mild Pain (1 - 3), Moderate Pain (4 - 6)  ALBUTerol    90 MICROgram(s) HFA Inhaler 2 Puff(s) Inhalation every 4 hours PRN Shortness of Breath and/or Wheezing  artificial tears (preservative free) Ophthalmic Solution 1 Drop(s) Both EYES every 4 hours PRN Dry Eyes  benzonatate 100 milliGRAM(s) Oral three times a day PRN Cough    CAPILLARY BLOOD GLUCOSE      POCT Blood Glucose.: 191 mg/dL (09 Jan 2021 12:11)  POCT Blood Glucose.: 148 mg/dL (09 Jan 2021 08:36)  POCT Blood Glucose.: 148 mg/dL (08 Jan 2021 22:16)  POCT Blood Glucose.: 132 mg/dL (08 Jan 2021 17:35)    I&O's Summary    08 Jan 2021 07:01  -  09 Jan 2021 07:00  --------------------------------------------------------  IN: 820 mL / OUT: 500 mL / NET: 320 mL        PHYSICAL EXAM:  Vital Signs Last 24 Hrs  T(C): 36.5 (09 Jan 2021 08:59), Max: 36.9 (08 Jan 2021 22:08)  T(F): 97.7 (09 Jan 2021 08:59), Max: 98.4 (08 Jan 2021 22:08)  HR: 50 (09 Jan 2021 08:59) (47 - 68)  BP: 143/47 (09 Jan 2021 08:59) (133/43 - 150/83)  BP(mean): --  RR: 18 (09 Jan 2021 08:59) (18 - 19)  SpO2: 99% (09 Jan 2021 08:59) (95% - 100%)  CONSTITUTIONAL: NAD, well-developed, well-groomed  ENMT: dry oral mucosa, BL eyes with mucous discharge patient states that this is a chronic problem)  RESPIRATORY: Normal respiratory effort; lungs are clear to auscultation bilaterally nasal canula  CARDIOVASCULAR: Regular rate, irregularly irregular rhythm, bradycardia  ABDOMEN: Nontender to palpation, normoactive bowel sounds, no rebound/guarding; No hepatosplenomegaly  PSYCH: affect appropriate  NEUROLOGY: Alert  SKIN: No rashes; no palpable lesions    LABS:                        11.6   4.45  )-----------( 310      ( 08 Jan 2021 07:25 )             39.2     01-08    142  |  107  |  32<H>  ----------------------------<  122<H>  4.9   |  25  |  0.85    Ca    9.7      08 Jan 2021 07:25    TPro  6.7  /  Alb  2.8<L>  /  TBili  <0.2  /  DBili  x   /  AST  31  /  ALT  19  /  AlkPhos  61  01-08              SARS-CoV-2: Detected (05 Jan 2021 23:56)      RADIOLOGY & ADDITIONAL TESTS:  Imaging from Last 24 Hours:    Electrocardiogram/QTc Interval:    COORDINATION OF CARE:  Care Discussed with Consultants/Other Providers:  
Patient on room air saturating 95 percent . No acute issues.    Patient denies: fever, chills, dizziness, weakness, HA, Changes in vision, CP, palpitations, , N/V/D/C, dysuria, changes in bowel movements, LE edema. ROS otherwise negative.    VITAL SIGNS:  T(F): 97.8 (01-12-21 @ 10:11)  HR: 47 (01-12-21 @ 16:30)  BP: 142/59 (01-12-21 @ 16:30)  RR: 18 (01-12-21 @ 16:30)  SpO2: 95% (01-12-21 @ 16:30)  Wt(kg): --    PHYSICAL EXAM:    Constitutional: WDWN, NAD  HEENT: PERRL, EOMI, sclera non-icteric, neck supple, trachea midline, no masses, no JVD, MMM, good dentition  Respiratory: decreased bs b/l  no wheezing, no rhonchi, no rales, without accessory muscle use and no intercostal retractions  Cardiovascular: RRR, normal S1S2, no M/R/G  Gastrointestinal: soft, NTND, no masses palpable, BS normal  Extremities: Warm, well perfused, pulses equal bilateral upper and lower extremities, no edema, no clubbing  Neurological: AAOx3, CN Grossly intact  Skin: Normal temperature, warm, dry    MEDICATIONS  (STANDING):  dexAMETHasone  Injectable 6 milliGRAM(s) IV Push daily  dextrose 5%. 500 milliLiter(s) (50 mL/Hr) IV Continuous <Continuous>  dextrose 50% Injectable 25 Gram(s) IV Push once  enoxaparin Injectable 40 milliGRAM(s) SubCutaneous daily  insulin lispro (ADMELOG) corrective regimen sliding scale   SubCutaneous three times a day before meals  insulin lispro (ADMELOG) corrective regimen sliding scale   SubCutaneous at bedtime  metoprolol tartrate 12.5 milliGRAM(s) Oral two times a day  senna 2 Tablet(s) Oral at bedtime    MEDICATIONS  (PRN):  acetaminophen   Tablet .. 650 milliGRAM(s) Oral every 6 hours PRN Temp greater or equal to 38C (100.4F), Mild Pain (1 - 3), Moderate Pain (4 - 6)  ALBUTerol    90 MICROgram(s) HFA Inhaler 2 Puff(s) Inhalation every 4 hours PRN Shortness of Breath and/or Wheezing  artificial tears (preservative free) Ophthalmic Solution 1 Drop(s) Both EYES every 4 hours PRN Dry Eyes  benzonatate 100 milliGRAM(s) Oral three times a day PRN Cough  polyethylene glycol 3350 17 Gram(s) Oral daily PRN Constipation      Allergies    No Known Allergies    Intolerances        LABS:                        12.6   3.92  )-----------( 283      ( 12 Jan 2021 07:36 )             39.7     01-12    138  |  99  |  27<H>  ----------------------------<  185<H>  5.1   |  30  |  0.70    Ca    9.6      12 Jan 2021 07:36  Phos  3.1     01-12  Mg     2.1     01-12    TPro  5.8<L>  /  Alb  2.7<L>  /  TBili  0.7  /  DBili  <0.2  /  AST  23  /  ALT  20  /  AlkPhos  53  01-12          RADIOLOGY & ADDITIONAL TESTS:  Reviewed
Patient admitted on January 08 2020 with covid pneumonia and respiratory failure and new onset covid , not anticoagulated as per family wish. She is doing well , saturating 98 percent today on 2 L NC on Remdesivir and decadrom.  INTERVAL HPI/OVERNIGHT EVENTS:  Patient was seen and examined at bedside. As per nurse and patient, no o/n events, patient resting comfortably. No complaints at this time. Patient denies: fever, chills, dizziness, weakness, HA, Changes in vision, CP, palpitations, SOB, cough, N/V/D/C, dysuria, changes in bowel movements, LE edema. ROS otherwise negative.    VITAL SIGNS:  T(F): 98 (01-11-21 @ 17:30)  HR: 65 (01-11-21 @ 17:30)  BP: 144/67 (01-11-21 @ 17:30)  RR: 18 (01-11-21 @ 17:30)  SpO2: 99% (01-11-21 @ 17:30)  Wt(kg): --    PHYSICAL EXAM:    Constitutional: WDWN, NAD  HEENT: PERRL, EOMI, sclera non-icteric, neck supple, trachea midline, no masses, no JVD, MMM, good dentition  Respiratory: b/l crackles , without accessory muscle use and no intercostal retractions  Cardiovascular: IRR, normal S1S2, no M/R/G  Gastrointestinal: soft, NTND, no masses palpable, BS normal  Extremities: Warm, well perfused, pulses equal bilateral upper and lower extremities, no edema, no clubbing  Neurological: AAOx3, CN Grossly intact  Skin: Normal temperature, warm, dry    MEDICATIONS  (STANDING):  dexAMETHasone  Injectable 6 milliGRAM(s) IV Push daily  dextrose 5%. 500 milliLiter(s) (50 mL/Hr) IV Continuous <Continuous>  dextrose 50% Injectable 25 Gram(s) IV Push once  enoxaparin Injectable 40 milliGRAM(s) SubCutaneous daily  insulin lispro (ADMELOG) corrective regimen sliding scale   SubCutaneous three times a day before meals  insulin lispro (ADMELOG) corrective regimen sliding scale   SubCutaneous at bedtime  metoprolol tartrate 12.5 milliGRAM(s) Oral two times a day  senna 2 Tablet(s) Oral at bedtime    MEDICATIONS  (PRN):  acetaminophen   Tablet .. 650 milliGRAM(s) Oral every 6 hours PRN Temp greater or equal to 38C (100.4F), Mild Pain (1 - 3), Moderate Pain (4 - 6)  ALBUTerol    90 MICROgram(s) HFA Inhaler 2 Puff(s) Inhalation every 4 hours PRN Shortness of Breath and/or Wheezing  artificial tears (preservative free) Ophthalmic Solution 1 Drop(s) Both EYES every 4 hours PRN Dry Eyes  benzonatate 100 milliGRAM(s) Oral three times a day PRN Cough  polyethylene glycol 3350 17 Gram(s) Oral daily PRN Constipation      Allergies    No Known Allergies    Intolerances        LABS:                        12.9   8.62  )-----------( 316      ( 11 Jan 2021 07:36 )             41.9     01-11    146<H>  |  105  |  29<H>  ----------------------------<  129<H>  5.1   |  31  |  0.73    Ca    10.2      11 Jan 2021 07:36  Phos  3.0     01-11  Mg     2.1     01-11    TPro  6.4  /  Alb  2.9<L>  /  TBili  0.5  /  DBili  <0.2  /  AST  38<H>  /  ALT  29  /  AlkPhos  59  01-11          RADIOLOGY & ADDITIONAL TESTS:  Reviewed

## 2021-01-16 PROCEDURE — 99239 HOSP IP/OBS DSCHRG MGMT >30: CPT

## 2021-01-22 PROBLEM — I10 ESSENTIAL (PRIMARY) HYPERTENSION: Chronic | Status: ACTIVE | Noted: 2021-01-05

## 2021-01-31 RX ADMIN — Medication 6 MILLIGRAM(S): at 05:28

## 2023-01-16 NOTE — ED ADULT TRIAGE NOTE - INTERPRETATION SERVICES DECLINED
Upon chart review, no lab appt has been made. Unable to reach letter sent.   Patient/Caregiver requests family/friend to interpret.

## 2024-06-07 NOTE — ED PROVIDER NOTE - RESPIRATORY [+], MLM
Expand All Collapse All    PRIMARY DIAGNOSIS: AMS, UTI  OUTPATIENT/OBSERVATION GOALS TO BE MET BEFORE DISCHARGE:  1. ADLs back to baseline: No     2. Activity and level of assistance: assist x 1     3. Pain status: Pain free.     4. Return to near baseline physical activity: No             Discharge Planner Nurse  Safe discharge environment identified: No  Barriers to discharge: Yes       Patient resting comfortably in bed, currently denies pain. Would not accept food, drink, or medications from previous RN, but took all from this writer with no hesitation. incontinent of urine, IVF @ 100, psych following, SW following                       COUGH

## 2024-06-13 NOTE — DIETITIAN INITIAL EVALUATION ADULT. - RD TO REMAIN AVAILABLE
----- Message from Saloni Saxena MD sent at 6/13/2024  7:01 AM EDT -----  Regarding: FW: ECC Message to Provider  That is up to her surgeon but may be since not within 30 days of surgery--if no significant changes to her health, we could do a VV just to check in if it is required.       ----- Message -----  From: Tania Valencia LPN  Sent: 6/12/2024  11:22 AM EDT  To: Saloni Saxena MD  Subject: FW: ECC Message to Provider                      Patient had Pre op visit here on 5/10/24 for trigger finger surgery on 5/23/24.  Second trigger finger surgery on other hand is on 6/18/24.    Will she need to schedule another pre op?    ----- Message -----  From: Jimenez Boyd  Sent: 6/12/2024  11:18 AM EDT  To: [facility] Clinical Staff  Subject: ECC Message to Provider                          ECC Message to Provider    Relationship to Patient: Self     Additional Information: Patient wanted her doctor to fill out the forms for her surgery on June 18, 2024.  --------------------------------------------------------------------------------------------------------------------------    Call Back Information: OK to leave message on voicemail  Preferred Call Back Number: Phone [phone] (home)            
Patient notified of provider's message.  
yes
